# Patient Record
Sex: FEMALE | Race: WHITE | NOT HISPANIC OR LATINO | URBAN - METROPOLITAN AREA
[De-identification: names, ages, dates, MRNs, and addresses within clinical notes are randomized per-mention and may not be internally consistent; named-entity substitution may affect disease eponyms.]

---

## 2017-07-27 ENCOUNTER — OFFICE VISIT - HEALTHEAST (OUTPATIENT)
Dept: FAMILY MEDICINE | Facility: CLINIC | Age: 52
End: 2017-07-27

## 2017-07-27 DIAGNOSIS — Z00.00 HEALTH CARE MAINTENANCE: ICD-10-CM

## 2017-07-27 LAB
CHOLEST SERPL-MCNC: 256 MG/DL
FASTING STATUS PATIENT QL REPORTED: YES
HDLC SERPL-MCNC: 72 MG/DL
LDLC SERPL CALC-MCNC: 172 MG/DL
TRIGL SERPL-MCNC: 60 MG/DL

## 2017-07-27 ASSESSMENT — MIFFLIN-ST. JEOR: SCORE: 1319

## 2017-08-01 ENCOUNTER — AMBULATORY - HEALTHEAST (OUTPATIENT)
Dept: FAMILY MEDICINE | Facility: CLINIC | Age: 52
End: 2017-08-01

## 2017-08-01 DIAGNOSIS — D64.9 ANEMIA: ICD-10-CM

## 2017-08-01 DIAGNOSIS — E78.5 HYPERLIPIDEMIA: ICD-10-CM

## 2017-08-01 DIAGNOSIS — E55.9 VITAMIN D DEFICIENCY: ICD-10-CM

## 2017-08-01 LAB
HPV INTERPRETATION - HISTORICAL: NORMAL
HPV INTERPRETER - HISTORICAL: NORMAL

## 2017-08-07 LAB
BKR LAB AP ABNORMAL BLEEDING: NO
BKR LAB AP BIRTH CONTROL/HORMONES: ABNORMAL
BKR LAB AP CERVICAL APPEARANCE: NORMAL
BKR LAB AP GYN ADEQUACY: ABNORMAL
BKR LAB AP GYN INTERPRETATION: ABNORMAL
BKR LAB AP HPV REFLEX: ABNORMAL
BKR LAB AP LMP: ABNORMAL
BKR LAB AP PATIENT STATUS: ABNORMAL
BKR LAB AP PREVIOUS ABNORMAL: ABNORMAL
BKR LAB AP PREVIOUS NORMAL: 2011
HIGH RISK?: NO
PATH REPORT.COMMENTS IMP SPEC: ABNORMAL
RESULT FLAG (HE HISTORICAL CONVERSION): ABNORMAL

## 2017-08-25 ENCOUNTER — HOSPITAL ENCOUNTER (OUTPATIENT)
Dept: MAMMOGRAPHY | Facility: CLINIC | Age: 52
Discharge: HOME | End: 2017-08-25

## 2017-08-25 DIAGNOSIS — Z00.00 HEALTH CARE MAINTENANCE: ICD-10-CM

## 2019-07-23 ENCOUNTER — OFFICE VISIT - HEALTHEAST (OUTPATIENT)
Dept: FAMILY MEDICINE | Facility: CLINIC | Age: 54
End: 2019-07-23

## 2019-07-23 ENCOUNTER — RECORDS - HEALTHEAST (OUTPATIENT)
Dept: GENERAL RADIOLOGY | Facility: CLINIC | Age: 54
End: 2019-07-23

## 2019-07-23 DIAGNOSIS — Z12.31 VISIT FOR SCREENING MAMMOGRAM: ICD-10-CM

## 2019-07-23 DIAGNOSIS — N95.1 PERIMENOPAUSAL SYMPTOMS: ICD-10-CM

## 2019-07-23 DIAGNOSIS — Z00.00 ROUTINE HEALTH MAINTENANCE: ICD-10-CM

## 2019-07-23 DIAGNOSIS — M25.551 HIP PAIN, RIGHT: ICD-10-CM

## 2019-07-23 DIAGNOSIS — Z13.220 SCREENING FOR LIPID DISORDERS: ICD-10-CM

## 2019-07-23 DIAGNOSIS — Z13.1 SCREENING FOR DIABETES MELLITUS: ICD-10-CM

## 2019-07-23 DIAGNOSIS — M25.551 PAIN IN RIGHT HIP: ICD-10-CM

## 2019-07-23 DIAGNOSIS — D50.9 IRON DEFICIENCY ANEMIA, UNSPECIFIED IRON DEFICIENCY ANEMIA TYPE: ICD-10-CM

## 2019-07-23 DIAGNOSIS — Z12.4 SCREENING FOR MALIGNANT NEOPLASM OF CERVIX: ICD-10-CM

## 2019-07-23 DIAGNOSIS — Z11.4 ENCOUNTER FOR SCREENING FOR HIV: ICD-10-CM

## 2019-07-23 DIAGNOSIS — Z13.29 SCREENING FOR THYROID DISORDER: ICD-10-CM

## 2019-07-23 DIAGNOSIS — E55.9 VITAMIN D DEFICIENCY: ICD-10-CM

## 2019-07-23 DIAGNOSIS — R79.89 ELEVATED TSH: ICD-10-CM

## 2019-07-23 DIAGNOSIS — Z12.11 SCREEN FOR COLON CANCER: ICD-10-CM

## 2019-07-23 DIAGNOSIS — Z11.59 NEED FOR HEPATITIS C SCREENING TEST: ICD-10-CM

## 2019-07-23 LAB
ALBUMIN SERPL-MCNC: 4.1 G/DL (ref 3.5–5)
ALP SERPL-CCNC: 67 U/L (ref 45–120)
ALT SERPL W P-5'-P-CCNC: 30 U/L (ref 0–45)
ANION GAP SERPL CALCULATED.3IONS-SCNC: 6 MMOL/L (ref 5–18)
AST SERPL W P-5'-P-CCNC: 29 U/L (ref 0–40)
BILIRUB SERPL-MCNC: 0.4 MG/DL (ref 0–1)
BUN SERPL-MCNC: 10 MG/DL (ref 8–22)
CALCIUM SERPL-MCNC: 10.2 MG/DL (ref 8.5–10.5)
CHLORIDE BLD-SCNC: 105 MMOL/L (ref 98–107)
CHOLEST SERPL-MCNC: 249 MG/DL
CO2 SERPL-SCNC: 28 MMOL/L (ref 22–31)
CREAT SERPL-MCNC: 0.8 MG/DL (ref 0.6–1.1)
ERYTHROCYTE [DISTWIDTH] IN BLOOD BY AUTOMATED COUNT: 13.2 % (ref 11–14.5)
FASTING STATUS PATIENT QL REPORTED: YES
GFR SERPL CREATININE-BSD FRML MDRD: >60 ML/MIN/1.73M2
GLUCOSE BLD-MCNC: 86 MG/DL (ref 70–125)
HCT VFR BLD AUTO: 38.7 % (ref 35–47)
HDLC SERPL-MCNC: 75 MG/DL
HGB BLD-MCNC: 12.8 G/DL (ref 12–16)
HIV 1+2 AB+HIV1 P24 AG SERPL QL IA: NEGATIVE
LDLC SERPL CALC-MCNC: 160 MG/DL
MCH RBC QN AUTO: 27.4 PG (ref 27–34)
MCHC RBC AUTO-ENTMCNC: 33.1 G/DL (ref 32–36)
MCV RBC AUTO: 83 FL (ref 80–100)
PLATELET # BLD AUTO: 188 THOU/UL (ref 140–440)
PMV BLD AUTO: 7.6 FL (ref 7–10)
POTASSIUM BLD-SCNC: 4 MMOL/L (ref 3.5–5)
PROT SERPL-MCNC: 7.1 G/DL (ref 6–8)
RBC # BLD AUTO: 4.68 MILL/UL (ref 3.8–5.4)
SODIUM SERPL-SCNC: 139 MMOL/L (ref 136–145)
T4 FREE SERPL-MCNC: 0.7 NG/DL (ref 0.7–1.8)
TRIGL SERPL-MCNC: 70 MG/DL
TSH SERPL DL<=0.005 MIU/L-ACNC: 12.47 UIU/ML (ref 0.3–5)
WBC: 4.1 THOU/UL (ref 4–11)

## 2019-07-23 ASSESSMENT — MIFFLIN-ST. JEOR: SCORE: 1307.1

## 2019-07-24 LAB
25(OH)D3 SERPL-MCNC: 21 NG/ML (ref 30–80)
HCV AB SERPL QL IA: NEGATIVE
HPV SOURCE: NORMAL
HUMAN PAPILLOMA VIRUS 16 DNA: NEGATIVE
HUMAN PAPILLOMA VIRUS 18 DNA: NEGATIVE
HUMAN PAPILLOMA VIRUS FINAL DIAGNOSIS: NORMAL
HUMAN PAPILLOMA VIRUS OTHER HR: NEGATIVE
SPECIMEN DESCRIPTION: NORMAL

## 2019-07-25 ENCOUNTER — HOSPITAL ENCOUNTER (OUTPATIENT)
Dept: MAMMOGRAPHY | Facility: CLINIC | Age: 54
Discharge: HOME | End: 2019-07-25

## 2019-07-25 ENCOUNTER — COMMUNICATION - HEALTHEAST (OUTPATIENT)
Dept: FAMILY MEDICINE | Facility: CLINIC | Age: 54
End: 2019-07-25

## 2019-07-25 DIAGNOSIS — Z12.31 VISIT FOR SCREENING MAMMOGRAM: ICD-10-CM

## 2019-07-25 DIAGNOSIS — E06.3 AUTOIMMUNE HYPOTHYROIDISM: ICD-10-CM

## 2019-07-25 LAB — THYROID PEROXIDASE ANTIBODIES - HISTORICAL: >2000 IU/ML (ref 0–5.6)

## 2019-07-26 ENCOUNTER — HOSPITAL ENCOUNTER (OUTPATIENT)
Dept: ULTRASOUND IMAGING | Facility: CLINIC | Age: 54
Discharge: HOME | End: 2019-07-26

## 2019-07-26 DIAGNOSIS — E06.3 AUTOIMMUNE HYPOTHYROIDISM: ICD-10-CM

## 2019-07-30 LAB
BKR LAB AP ABNORMAL BLEEDING: NORMAL
BKR LAB AP BIRTH CONTROL/HORMONES: NORMAL
BKR LAB AP CERVICAL APPEARANCE: NORMAL
BKR LAB AP GYN ADEQUACY: NORMAL
BKR LAB AP GYN INTERPRETATION: NORMAL
BKR LAB AP HPV REFLEX: NORMAL
BKR LAB AP LMP: NORMAL
BKR LAB AP PATIENT STATUS: NORMAL
BKR LAB AP PREVIOUS ABNORMAL: NORMAL
BKR LAB AP PREVIOUS NORMAL: NORMAL
HIGH RISK?: NO
PATH REPORT.COMMENTS IMP SPEC: NORMAL
RESULT FLAG (HE HISTORICAL CONVERSION): NORMAL

## 2019-08-12 ENCOUNTER — COMMUNICATION - HEALTHEAST (OUTPATIENT)
Dept: FAMILY MEDICINE | Facility: CLINIC | Age: 54
End: 2019-08-12

## 2019-09-06 ENCOUNTER — OFFICE VISIT - HEALTHEAST (OUTPATIENT)
Dept: FAMILY MEDICINE | Facility: CLINIC | Age: 54
End: 2019-09-06

## 2019-09-06 DIAGNOSIS — E06.3 AUTOIMMUNE HYPOTHYROIDISM: ICD-10-CM

## 2019-09-06 DIAGNOSIS — Z12.11 SCREEN FOR COLON CANCER: ICD-10-CM

## 2019-09-07 LAB
T4 FREE SERPL-MCNC: 1.4 NG/DL (ref 0.7–1.8)
TSH SERPL DL<=0.005 MIU/L-ACNC: 0.18 UIU/ML (ref 0.3–5)

## 2019-09-09 ENCOUNTER — COMMUNICATION - HEALTHEAST (OUTPATIENT)
Dept: FAMILY MEDICINE | Facility: CLINIC | Age: 54
End: 2019-09-09

## 2019-09-09 ENCOUNTER — AMBULATORY - HEALTHEAST (OUTPATIENT)
Dept: FAMILY MEDICINE | Facility: CLINIC | Age: 54
End: 2019-09-09

## 2019-09-09 DIAGNOSIS — E06.3 AUTOIMMUNE HYPOTHYROIDISM: ICD-10-CM

## 2019-12-11 ENCOUNTER — COMMUNICATION - HEALTHEAST (OUTPATIENT)
Dept: FAMILY MEDICINE | Facility: CLINIC | Age: 54
End: 2019-12-11

## 2019-12-11 DIAGNOSIS — E06.3 AUTOIMMUNE HYPOTHYROIDISM: ICD-10-CM

## 2020-01-20 ENCOUNTER — AMBULATORY - HEALTHEAST (OUTPATIENT)
Dept: LAB | Facility: CLINIC | Age: 55
End: 2020-01-20

## 2020-01-20 DIAGNOSIS — E06.3 AUTOIMMUNE HYPOTHYROIDISM: ICD-10-CM

## 2020-01-20 LAB
T4 FREE SERPL-MCNC: 0.8 NG/DL (ref 0.7–1.8)
TSH SERPL DL<=0.005 MIU/L-ACNC: 8.61 UIU/ML (ref 0.3–5)

## 2020-01-22 ENCOUNTER — COMMUNICATION - HEALTHEAST (OUTPATIENT)
Dept: FAMILY MEDICINE | Facility: CLINIC | Age: 55
End: 2020-01-22

## 2020-01-22 DIAGNOSIS — E06.3 AUTOIMMUNE HYPOTHYROIDISM: ICD-10-CM

## 2020-06-09 ENCOUNTER — AMBULATORY - HEALTHEAST (OUTPATIENT)
Dept: FAMILY MEDICINE | Facility: CLINIC | Age: 55
End: 2020-06-09

## 2020-06-09 ENCOUNTER — VIRTUAL VISIT (OUTPATIENT)
Dept: FAMILY MEDICINE | Facility: OTHER | Age: 55
End: 2020-06-09

## 2020-06-09 DIAGNOSIS — Z20.822 SUSPECTED COVID-19 VIRUS INFECTION: ICD-10-CM

## 2020-06-10 NOTE — PROGRESS NOTES
"Date: 2020 10:52:38  Clinician: Eduarda Mir  Clinician NPI: [id number]  Patient: Myah Soares  Patient : 1965  Patient Address: [address] [place] [address] [place] [address]  Patient Phone: [phone]  Visit Protocol: URI  Patient Summary:  Myah is a 54 year old ( : 1965 ) female who initiated a Visit for COVID-19 (Coronavirus) evaluation and screening. When asked the question \"Please sign me up to receive news, health information and promotions. \", Myah responded \"No\".    Myah states her symptoms started 1-2 days ago.   Her symptoms consist of a sore throat, myalgia, and a headache.   Symptom details     Sore throat: Myah reports having mild throat pain (1-3 on a 10 point pain scale), does not have exudate on her tonsils, and can swallow liquids. The lymph nodes in her neck are not enlarged. A rash has not appeared on the skin since the sore throat started.     Headache: She states the headache is mild (1-3 on a 10 point pain scale).      Myah denies having wheezing, nausea, teeth pain, ageusia, diarrhea, enlarged lymph nodes, malaise, anosmia, facial pain or pressure, fever, cough, nasal congestion, vomiting, rhinitis, ear pain, and chills. She also denies having recent facial or sinus surgery in the past 60 days and taking antibiotic medication for the symptoms. She is not experiencing dyspnea.   Precipitating events  Within the past week, Myah has not been exposed to someone with strep throat. She has not recently been exposed to someone with influenza. Myah has been in close contact with the following high risk individuals: adults 65 or older and people with asthma, heart disease or diabetes.   Pertinent COVID-19 (Coronavirus) information  In the past 14 days, Myah has not worked in a congregate living setting.   She does not work or volunteer as healthcare worker or a  and does not work or volunteer in a healthcare facility.   Myah also has not lived in a " congregate living setting in the past 14 days. She does not live with a healthcare worker.   Myah has not had a close contact with a laboratory-confirmed COVID-19 patient within 14 days of symptom onset.   Pertinent medical history  Myah does not get yeast infections when she takes antibiotics.   Myah does not need a return to work/school note.   Weight: 155 lbs   Myah does not smoke or use smokeless tobacco.   Weight: 155 lbs    MEDICATIONS: levothyroxine oral, ALLERGIES: NKDA  Clinician Response:  Dear Myah,   Your symptoms show that you may have coronavirus (COVID-19). This illness can cause fever, cough and trouble breathing. Many people get a mild case and get better on their own. Some people can get very sick.  What should I do?  We would like to test you for this virus. This will be a curbside test done outside the clinic.   1. Please call [phone] to schedule your visit. Explain that you were referred by [facility] to have a COVID-19 test. Be ready to share your [facility] visit ID number.  The following will serve as your written order for this COVID Test, ordered by me, for the indication of suspected COVID [Z20.828]: The test will be ordered in [employer], our electronic health record, after you are scheduled. It will show as ordered and authorized by Lio Jensen MD.  Order: COVID-19 (Coronavirus) PCR for SYMPTOMATIC testing from [facility].      2. When it's time for your COVID test:  Stay at least 6 feet away from others. (If someone will drive you to your test, stay in the backseat, as far away from the  as you can.)   Cover your mouth and nose with a mask, tissue or washcloth.  Go straight to the testing site. Don't make any stops on the way there or back.      3.Starting now: Stay home and away from others (self-isolate) until:   You've had no fever---and no medicine that reduces fever---for 3 full days (72 hours). And...   Your other symptoms have gotten better. For example, your cough or breathing has  "improved. And...   At least 10 days have passed since your symptoms started.       During this time, don't leave the house except for testing or medical care.   Stay in your own room, even for meals. Use your own bathroom if you can.   Stay away from others in your home. No hugging, kissing or shaking hands. No visitors.  Don't go to work, school or anywhere else.    Clean \"high touch\" surfaces often (doorknobs, counters, handles, etc.). Use a household cleaning spray or wipes. You'll find a full list of  on the EPA website: www.epa.gov/pesticide-registration/list-n-disinfectants-use-against-sars-cov-2.   Cover your mouth and nose with a mask, tissue or washcloth to avoid spreading germs.  Wash your hands and face often. Use soap and water.  Caregivers in these groups are at risk for severe illness due to COVID-19:  o People 65 years and older  o People who live in a nursing home or long-term care facility  o People with chronic disease (lung, heart, cancer, diabetes, kidney, liver, immunologic)  o People who have a weakened immune system, including those who:   Are in cancer treatment  Take medicine that weakens the immune system, such as corticosteroids  Had a bone marrow or organ transplant  Have an immune deficiency  Have poorly controlled HIV or AIDS  Are obese (body mass index of 40 or higher)  Smoke regularly   o Caregivers should wear gloves while washing dishes, handling laundry and cleaning bedrooms and bathrooms.  o Use caution when washing and drying laundry: Don't shake dirty laundry, and use the warmest water setting that you can.  o For more tips, go to www.cdc.gov/coronavirus/2019-ncov/downloads/10Things.pdf.      How can I take care of myself?   Get lots of rest. Drink extra fluids (unless a doctor has told you not to).   Take Tylenol (acetaminophen) for fever or pain. If you have liver or kidney problems, ask your family doctor if it's okay to take Tylenol.   Adults can take either:    650 " mg (two 325 mg pills) every 4 to 6 hours, or...   1,000 mg (two 500 mg pills) every 8 hours as needed.    Note: Don't take more than 3,000 mg in one day. Acetaminophen is found in many medicines (both prescribed and over-the-counter medicines). Read all labels to be sure you don't take too much.   For children, check the Tylenol bottle for the right dose. The dose is based on the child's age or weight.    If you have other health problems (like cancer, heart failure, an organ transplant or severe kidney disease): Call your specialty clinic if you don't feel better in the next 2 days.       Know when to call 911. Emergency warning signs include:    Trouble breathing or shortness of breath Pain or pressure in the chest that doesn't go away Feeling confused like you haven't felt before, or not being able to wake up Bluish-colored lips or face  5.Sign up for [employer]. We know it's scary to hear that you might have COVID-19. We want to track your symptoms to make sure you're okay over the next 2 weeks. Please look for an email from [employer]---this is a free, online program that we'll use to keep in touch. To sign up, follow the link in the email. Learn more at [email or web address].      Where can I get more information?   [facility] -- About COVID-19: [email or web address]   CDC -- What to Do If You're Sick: www.cdc.gov/coronavirus/2019-ncov/about/steps-when-sick.html   CDC -- Ending Home Isolation: www.cdc.gov/coronavirus/2019-ncov/hcp/disposition-in-home-patients.html   CDC -- Caring for Someone: www.cdc.gov/coronavirus/2019-ncov/if-you-are-sick/care-for-someone.html   [facility] -- Interim Guidance for Hospital Discharge to Home: www.health.[facility].mn.us/diseases/coronavirus/hcp/hospdischarge.pdf   [facility] clinical trials (COVID-19 research studies): clinicalaffairs.[facility]/umn-clinical-trials    Below are the COVID-19 hotlines at the [place] Department of Health ([facility]).  Interpreters are available.    For health questions: Call 929-853-1000 or 1-703.289.6068 (7 a.m. to 7 p.m.) For questions about schools and childcare: Call 876-929-7745 or 1-279.952.4204 (7 a.m. to 7 p.m.)    Diagnosis: Myalgia  Diagnosis ICD: M79.1

## 2020-06-12 ENCOUNTER — COMMUNICATION - HEALTHEAST (OUTPATIENT)
Dept: FAMILY MEDICINE | Facility: CLINIC | Age: 55
End: 2020-06-12

## 2020-06-19 ENCOUNTER — AMBULATORY - HEALTHEAST (OUTPATIENT)
Dept: FAMILY MEDICINE | Facility: CLINIC | Age: 55
End: 2020-06-19

## 2020-06-19 ENCOUNTER — VIRTUAL VISIT (OUTPATIENT)
Dept: FAMILY MEDICINE | Facility: OTHER | Age: 55
End: 2020-06-19

## 2020-06-19 DIAGNOSIS — Z20.822 SUSPECTED COVID-19 VIRUS INFECTION: ICD-10-CM

## 2020-06-19 NOTE — PROGRESS NOTES
"Date: 2020 01:43:49  Clinician: Yasemin Macias  Clinician NPI: [id number]  Patient: Myah Soares  Patient : 1965  Patient Address: [address] [place] [address] [address] [place] [id number]  Patient Phone: [phone]  Visit Protocol: URI  Patient Summary:  Myah is a 54 year old ( : 1965 ) female who initiated a Visit for COVID-19 (Coronavirus) evaluation and screening. When asked the question \"Please sign me up to receive news, health information and promotions. \", Myah responded \"No\".    Myah states her symptoms started 1-2 days ago.   Her symptoms consist of myalgia and a cough. She is experiencing mild difficulty breathing with activities but can speak normally in full sentences.   Symptom details   Cough: Myah coughs every 5-10 minutes and her cough is not more bothersome at night. Phlegm does not come into her throat when she coughs. She does not believe her cough is caused by post-nasal drip.    Myah denies having wheezing, nausea, teeth pain, ageusia, diarrhea, sore throat, enlarged lymph nodes, malaise, anosmia, facial pain or pressure, fever, nasal congestion, vomiting, rhinitis, ear pain, headache, and chills. She also denies having recent facial or sinus surgery in the past 60 days and taking antibiotic medication for the symptoms.   Precipitating events  She has not recently been exposed to someone with influenza. Myah has been in close contact with the following high risk individuals: people with asthma, heart disease or diabetes.   Pertinent COVID-19 (Coronavirus) information  In the past 14 days, Myah has not worked in a congregate living setting.   She does not work or volunteer as healthcare worker or a  and does not work or volunteer in a healthcare facility.   Myah also has not lived in a congregate living setting in the past 14 days. She does not live with a healthcare worker.   Myah has not had a close contact with a laboratory-confirmed COVID-19 patient within " 14 days of symptom onset.   Pertinent medical history  Myah does not get yeast infections when she takes antibiotics.   Myah does not need a return to work/school note.   Weight: 157 lbs   Myah does not smoke or use smokeless tobacco.   Weight: 157 lbs    MEDICATIONS: levothyroxine oral, ALLERGIES: NKDA  Clinician Response:  Dear Myah,      Your symptoms show that you may have coronavirus (COVID-19). This illness can cause fever, cough and trouble breathing. Many people get a mild case and get better on their own. Some people can get very sick.  What should I do?  We would like to test you for this virus.   1. Please call [phone] to schedule your visit. Explain that you were referred by [facility] to have a COVID-19 test. Be ready to [facility].  The following will serve as your written order for this COVID Test, ordered by me, for the indication of suspected COVID [Z20.828]: The test will be ordered in [employer], our electronic health record, after you are scheduled. It will show as ordered and authorized by Lio Jensen MD.  Order: COVID-19 (Coronavirus) PCR for SYMPTOMATIC testing from [facility].      2. When it's time for your COVID test:  Stay at least 6 feet away from others. (If someone will drive you to your test, stay in the backseat, as far away from the  as you can.)   Cover your mouth and nose with a mask, tissue or washcloth.  Go straight to the testing site. Don't make any stops on the way there or back.      3.Starting now: Stay home and away from others (self-isolate) until:   You've had no fever---and no medicine that reduces fever---for 3 full days (72 hours). And...   Your other symptoms have gotten better. For example, your cough or breathing has improved. And...   At least 10 days have passed since your symptoms started.       During this time, don't leave the house except for testing or medical care.   Stay in your own room, even for meals. Use your own bathroom if you  "can.   Stay away from others in your home. No hugging, kissing or shaking hands. No visitors.  Don't go to work, school or anywhere else.    Clean \"high touch\" surfaces often (doorknobs, counters, handles, etc.). Use a household cleaning spray or wipes. You'll find a full list of  on the EPA website: www.epa.gov/pesticide-registration/list-n-disinfectants-use-against-sars-cov-2.   Cover your mouth and nose with a mask, tissue or washcloth to avoid spreading germs.  Wash your hands and face often. Use soap and water.  Caregivers in these groups are at risk for severe illness due to COVID-19:  o People 65 years and older  o People who live in a nursing home or long-term care facility  o People with chronic disease (lung, heart, cancer, diabetes, kidney, liver, immunologic)  o People who have a weakened immune system, including those who:   Are in cancer treatment  Take medicine that weakens the immune system, such as corticosteroids  Had a bone marrow or organ transplant  Have an immune deficiency  Have poorly controlled HIV or AIDS  Are obese (body mass index of 40 or higher)  Smoke regularly   o Caregivers should wear gloves while washing dishes, handling laundry and cleaning bedrooms and bathrooms.  o Use caution when washing and drying laundry: Don't shake dirty laundry, and use the warmest water setting that you can.  o For more tips, go to www.cdc.gov/coronavirus/2019-ncov/downloads/10Things.pdf.      How can I take care of myself?   Get lots of rest. Drink extra fluids (unless a doctor has told you not to).   Take Tylenol (acetaminophen) for fever or pain. If you have liver or kidney problems, ask your family doctor if it's okay to take Tylenol.   Adults can take either:    650 mg (two 325 mg pills) every 4 to 6 hours, or...   1,000 mg (two 500 mg pills) every 8 hours as needed.    Note: Don't take more than 3,000 mg in one day. Acetaminophen is found in many medicines (both prescribed and " over-the-counter medicines). Read all labels to be sure you don't take too much.   For children, check the Tylenol bottle for the right dose. The dose is based on the child's age or weight.    If you have other health problems (like cancer, heart failure, an organ transplant or severe kidney disease): Call your specialty clinic if you don't feel better in the next 2 days.       Know when to call 911. Emergency warning signs include:    Trouble breathing or shortness of breath Pain or pressure in the chest that doesn't go away Feeling confused like you haven't felt before, or not being able to wake up Bluish-colored lips or face.  Where can I get more information?    [employer] [facility] -- About COVID-19: [email or web address]   CDC -- What to Do If You're Sick: www.cdc.gov/coronavirus/2019-ncov/about/steps-when-sick.html   CDC -- Ending Home Isolation: www.cdc.gov/coronavirus/2019-ncov/hcp/disposition-in-home-patients.html   CDC -- Caring for Someone: www.cdc.gov/coronavirus/2019-ncov/if-you-are-sick/care-for-someone.html   [facility] -- Interim Guidance for Hospital Discharge to Home: www.health.[facility].mn.us/diseases/coronavirus/hcp/hospdischarge.pdf   [facility] clinical trials (COVID-19 research studies): clinicalaffairs.[facility]-clinical-trials    Below are the COVID-19 hotlines at the [facility] of Health ([facility]). Interpreters are available.    For health questions: Call 722-219-7872 or 1-989.675.8350 (7 a.m. to 7 p.m.) For questions about schools and childcare: Call 466-811-9607 or 1-102.115.5133 (7 a.m. to 7 p.m.)    Diagnosis: Cough  Diagnosis ICD: R05

## 2020-06-22 ENCOUNTER — COMMUNICATION - HEALTHEAST (OUTPATIENT)
Dept: FAMILY MEDICINE | Facility: CLINIC | Age: 55
End: 2020-06-22

## 2020-08-14 ENCOUNTER — VIRTUAL VISIT (OUTPATIENT)
Dept: FAMILY MEDICINE | Facility: OTHER | Age: 55
End: 2020-08-14

## 2020-08-14 ENCOUNTER — AMBULATORY - HEALTHEAST (OUTPATIENT)
Dept: FAMILY MEDICINE | Facility: CLINIC | Age: 55
End: 2020-08-14

## 2020-08-14 DIAGNOSIS — Z20.822 SUSPECTED COVID-19 VIRUS INFECTION: ICD-10-CM

## 2020-08-14 NOTE — PROGRESS NOTES
"Date: 2020 14:23:50  Clinician: Jihan Wagner  Clinician NPI: [id number]  Patient: Myah Soares  Patient : 1965  Patient Address: [address] [place] [address] [address] [place] [id number]  Patient Phone: [phone]  Visit Protocol: URI  Patient Summary:  Myah is a 54 year old ( : 1965 ) female who initiated a Visit for COVID-19 (Coronavirus) evaluation and screening. When asked the question \"Please sign me up to receive news, health information and promotions. \", Myah responded \"No\".    Myah states her symptoms started 1-2 days ago.   Her symptoms consist of a sore throat and a cough.   Symptom details     Cough: Myah coughs a few times an hour and her cough is not more bothersome at night. Phlegm does not come into her throat when she coughs. She does not believe her cough is caused by post-nasal drip.     Sore throat: Myah reports having mild throat pain (1-3 on a 10 point pain scale), does not have exudate on her tonsils, and can swallow liquids. The lymph nodes in her neck are not enlarged. A rash has not appeared on the skin since the sore throat started.      Myah denies having ear pain, headache, chills, enlarged lymph nodes, nausea, teeth pain, ageusia, diarrhea, nasal congestion, vomiting, rhinitis, myalgias, anosmia, facial pain or pressure, fever, wheezing, and malaise. She also denies taking antibiotic medication in the past month and having recent facial or sinus surgery in the past 60 days. She is not experiencing dyspnea.   Precipitating events  Within the past week, Myah has not been exposed to someone with strep throat. She has not recently been exposed to someone with influenza. Myah has been in close contact with the following high risk individuals: people with asthma, heart disease or diabetes and adults 65 or older.   Pertinent COVID-19 (Coronavirus) information  In the past 14 days, Myah has not worked in a congregate living setting.   She does not work or volunteer as " healthcare worker or a  and does not work or volunteer in a healthcare facility.   Myah also has not lived in a congregate living setting in the past 14 days. She does not live with a healthcare worker.   Myah has not had a close contact with a laboratory-confirmed COVID-19 patient within 14 days of symptom onset.   Since December 2019, Myah and has not had upper respiratory infection or influenza-like illness. Has not been diagnosed with lab-confirmed COVID-19 test   Pertinent medical history  Myah does not get yeast infections when she takes antibiotics.   Myah does not need a return to work/school note.   Weight: 158 lbs   Myah does not smoke or use smokeless tobacco.   Weight: 158 lbs    MEDICATIONS: levothyroxine oral, ALLERGIES: NKDA  Clinician Response:  Dear Myah,   Your symptoms show that you may have coronavirus (COVID-19). This illness can cause fever, cough and trouble breathing. Many people get a mild case and get better on their own. Some people can get very sick.  What should I do?  We would like to test you for this virus.   1. Please call [phone] to schedule your visit. Explain that you were referred by [facility] to have a COVID-19 test. Be ready to [facility].  The following will serve as your written order for this COVID Test, ordered by me, for the indication of suspected COVID [Z20.828]: The test will be ordered in [employer], our electronic health record, after you are scheduled. It will show as ordered and authorized by Lio Jensen MD.  Order: COVID-19 (Coronavirus) PCR for SYMPTOMATIC testing [facility].      2. When it's time for your COVID test:  Stay at least 6 feet away from others. (If someone will drive you to your test, stay in the backseat, as far away from the  as you can.)   Cover your mouth and nose with a mask, tissue or washcloth.  Go straight to the testing site. Don't make any stops on the way there or back.      3.Starting now:  "Stay home and away from others (self-isolate) until:   You've had no fever---and no medicine that reduces fever---for one full day (24 hours). And...   Your other symptoms have gotten better. For example, your cough or breathing has improved. And...   At least 10 days have passed since your symptoms started.       During this time, don't leave the house except for testing or medical care.   Stay in your own room, even for meals. Use your own bathroom if you can.   Stay away from others in your home. No hugging, kissing or shaking hands. No visitors.  Don't go to work, school or anywhere else.    Clean \"high touch\" surfaces often (doorknobs, counters, handles, etc.). Use a household cleaning spray or wipes. You'll find a full list of  on the EPA website: www.epa.gov/pesticide-registration/list-n-disinfectants-use-against-sars-cov-2.   Cover your mouth and nose with a mask, tissue or washcloth to avoid spreading germs.  Wash your hands and face often. Use soap and water.  Caregivers in these groups are at risk for severe illness due to COVID-19:  o People 65 years and older  o People who live in a nursing home or long-term care facility  o People with chronic disease (lung, heart, cancer, diabetes, kidney, liver, immunologic)  o People who have a weakened immune system, including those who:   Are in cancer treatment  Take medicine that weakens the immune system, such as corticosteroids  Had a bone marrow or organ transplant  Have an immune deficiency  Have poorly controlled HIV or AIDS  Are obese (body mass index of 40 or higher)  Smoke regularly   o Caregivers should wear gloves while washing dishes, handling laundry and cleaning bedrooms and bathrooms.  o Use caution when washing and drying laundry: Don't shake dirty laundry, and use the warmest water setting that you can.  o For more tips, go to www.cdc.gov/coronavirus/2019-ncov/downloads/10Things.pdf.    4.Sign up for Shyann Cintron. We know it's scary to " hear that you might have COVID-19. We want to track your symptoms to make sure you're okay over the next 2 weeks. Please look for an email from [employer]---this is a free, online program that we'll use to keep in touch. To sign up, follow the link in the email. Learn more at [email or web address]  How can I take care of myself?   Get lots of rest. Drink extra fluids (unless a doctor has told you not to).   Take Tylenol (acetaminophen) for fever or pain. If you have liver or kidney problems, ask your family doctor if it's okay to take Tylenol.   Adults can take either:    650 mg (two 325 mg pills) every 4 to 6 hours, or...   1,000 mg (two 500 mg pills) every 8 hours as needed.    Note: Don't take more than 3,000 mg in one day. Acetaminophen is found in many medicines (both prescribed and over-the-counter medicines). Read all labels to be sure you don't take too much.   For children, check the Tylenol bottle for the right dose. The dose is based on the child's age or weight.    If you have other health problems (like cancer, heart failure, an organ transplant or severe kidney disease): Call your specialty clinic if you don't feel better in the next 2 days.       Know when to call 911. Emergency warning signs include:    Trouble breathing or shortness of breath Pain or pressure in the chest that doesn't go away Feeling confused like you haven't felt before, or not being able to wake up Bluish-colored lips or face.  Where can I get more information?    [employer] -- About COVID-19: [email or web address]   CDC -- What to Do If You're Sick: www.cdc.gov/coronavirus/2019-ncov/about/steps-when-sick.html   CDC -- Ending Home Isolation: www.cdc.gov/coronavirus/2019-ncov/hcp/disposition-in-home-patients.html   CDC -- Caring for Someone: www.cdc.gov/coronavirus/2019-ncov/if-you-are-sick/care-for-someone.html   [facility] -- Interim Guidance for Hospital Discharge to Home:  www.health.[facility].mn.us/diseases/coronavirus/hcp/hospdischarge.pdf   [facility] clinical trials (COVID-19 research studies): clinicalaffairs.[facility]/umn-clinical-trials    Below are the COVID-19 hotlines at the [facility] ([facility]). Interpreters are available.    For health questions: Call 752-172-2482 or 1-424.647.1973 (7 a.m. to 7 p.m.) For questions about schools and childcare: Call 565-886-2967 or 1-600.601.3621 (7 a.m. to 7 p.m.)    Diagnosis: Cough  Diagnosis ICD: R05

## 2020-08-15 ENCOUNTER — AMBULATORY - HEALTHEAST (OUTPATIENT)
Dept: FAMILY MEDICINE | Facility: CLINIC | Age: 55
End: 2020-08-15

## 2020-08-15 DIAGNOSIS — Z20.822 SUSPECTED COVID-19 VIRUS INFECTION: ICD-10-CM

## 2020-08-18 ENCOUNTER — COMMUNICATION - HEALTHEAST (OUTPATIENT)
Dept: SCHEDULING | Facility: CLINIC | Age: 55
End: 2020-08-18

## 2020-09-17 ENCOUNTER — COMMUNICATION - HEALTHEAST (OUTPATIENT)
Dept: FAMILY MEDICINE | Facility: CLINIC | Age: 55
End: 2020-09-17

## 2020-09-17 DIAGNOSIS — Z23 NEED FOR INFLUENZA VACCINATION: ICD-10-CM

## 2020-09-17 DIAGNOSIS — Z12.11 SCREENING FOR COLON CANCER: ICD-10-CM

## 2020-09-24 ENCOUNTER — AMBULATORY - HEALTHEAST (OUTPATIENT)
Dept: LAB | Facility: CLINIC | Age: 55
End: 2020-09-24

## 2020-09-24 ENCOUNTER — AMBULATORY - HEALTHEAST (OUTPATIENT)
Dept: NURSING | Facility: CLINIC | Age: 55
End: 2020-09-24

## 2020-09-24 DIAGNOSIS — E06.3 AUTOIMMUNE HYPOTHYROIDISM: ICD-10-CM

## 2020-09-24 DIAGNOSIS — Z23 NEED FOR INFLUENZA VACCINATION: ICD-10-CM

## 2020-09-24 LAB
T4 FREE SERPL-MCNC: 0.8 NG/DL (ref 0.7–1.8)
TSH SERPL DL<=0.005 MIU/L-ACNC: 7.01 UIU/ML (ref 0.3–5)

## 2020-09-29 ENCOUNTER — RECORDS - HEALTHEAST (OUTPATIENT)
Dept: ADMINISTRATIVE | Facility: OTHER | Age: 55
End: 2020-09-29

## 2020-09-29 LAB — COLOGUARD-ABSTRACT: NEGATIVE

## 2020-10-01 ENCOUNTER — VIRTUAL VISIT (OUTPATIENT)
Dept: FAMILY MEDICINE | Facility: OTHER | Age: 55
End: 2020-10-01

## 2020-10-01 ENCOUNTER — AMBULATORY - HEALTHEAST (OUTPATIENT)
Dept: INTERNAL MEDICINE | Facility: CLINIC | Age: 55
End: 2020-10-01

## 2020-10-01 DIAGNOSIS — Z20.822 SUSPECTED 2019 NOVEL CORONAVIRUS INFECTION: ICD-10-CM

## 2020-10-01 NOTE — PROGRESS NOTES
"Date: 10/01/2020 11:29:31  Clinician: Griffin Dominguez  Clinician NPI: [id number]  Patient: Myah Soares  Patient : 1965  Patient Address: [address] [place] [address] [place] [address]  Patient Phone: [phone]  Visit Protocol: URI  Patient Summary:  Myah is a 54 year old ( : 1965 ) female who initiated a [facility] Visit for COVID-19 (Coronavirus) evaluation and screening. When asked the question \"Please sign me up to receive news, health information and promotions. \", Myah responded \"No\".    Myah states her symptoms started 1-2 days ago.   Her symptoms consist of a cough, nasal congestion, rhinitis, and myalgia.   Symptom details     Nasal secretions: The color of her mucus is clear.    Cough: Myah coughs a few times an hour and her cough is not more bothersome at night. Phlegm does not come into her throat when she coughs. She does not believe her cough is caused by post-nasal drip.      Myah denies having ear pain, headache, wheezing, fever, enlarged lymph nodes, anosmia, vomiting, nausea, facial pain or pressure, chills, malaise, sore throat, teeth pain, ageusia, and diarrhea. She also denies taking antibiotic medication in the past month and having recent facial or sinus surgery in the past 60 days. She is not experiencing dyspnea.   Precipitating events  She has not recently been exposed to someone with influenza. Myah has been in close contact with the following high risk individuals: adults 65 or older and people with asthma, heart disease or diabetes.   Pertinent COVID-19 (Coronavirus) information  In the past 14 days, Myah has not worked in a congregate living setting.   She does not work or volunteer as healthcare worker or a  and does not work or volunteer in a healthcare facility.   Myah also has not lived in a congregate living setting in the past 14 days. She does not live with a healthcare worker.   Myah has not had a close contact with a laboratory-confirmed " COVID-19 patient within 14 days of symptom onset.   Since December 2019, Myah and has had upper respiratory infection (URI) or influenza-like illness. Has not been diagnosed with lab-confirmed COVID-19 test      Date(s) of previous URI or influenza-like illness (free-text): March 2020     Symptoms Myah experienced during previous URI or influenza-like illness as reported by the patient (free-text): Cough, runny nose, sneezing, achiness        Pertinent medical history  Myah does not get yeast infections when she takes antibiotics.   Myah does not need a return to work/school note.   Weight: 155 lbs   Myah does not smoke or use smokeless tobacco.   Weight: 155 lbs    MEDICATIONS: levothyroxine oral, ALLERGIES: NKDA  Clinician Response:  Dear Myah,   Your symptoms show that you may have coronavirus (COVID-19). This illness can cause fever, cough and trouble breathing. Many people get a mild case and get better on their own. Some people can get very sick.  What should I do?  We would like to test you for this virus.   1. Please call [phone] to schedule your visit. Explain that you were referred by [facility] to have a COVID-19 test. Be [facility].  The following will serve as your written order for this COVID Test, ordered by me, for the indication of suspected COVID [Z20.828]: The test will be ordered in [employer], our electronic health record, after you are scheduled. It will show as ordered and authorized by Lio Jensen MD.  Order: COVID-19 (Coronavirus) PCR [facility]TOMATIC [facility].      2. When it's time for your COVID test:  Stay at least 6 feet away from others. (If someone will drive you to your test, stay in the backseat, as far away from the  as you can.)   Cover your mouth and nose with a mask, tissue or washcloth.  Go straight to the testing site. Don't make any stops on the way there or back.      3.Starting now: Stay home and away from others  "(self-isolate) until:   You've had no fever---and no medicine that reduces fever---for one full day (24 hours). And...   Your other symptoms have gotten better. For example, your cough or breathing has improved. And...   At least 10 days have passed since your symptoms started.       During this time, don't leave the house except for testing or medical care.   Stay in your own room, even for meals. Use your own bathroom if you can.   Stay away from others in your home. No hugging, kissing or shaking hands. No visitors.  Don't go to work, school or anywhere else.    Clean \"high touch\" surfaces often (doorknobs, counters, handles, etc.). Use a household cleaning spray or wipes. You'll find a full list of  on the EPA website: www.epa.gov/pesticide-registration/list-n-disinfectants-use-against-sars-cov-2.   Cover your mouth and nose with a mask, tissue or washcloth to avoid spreading germs.  Wash your hands and face often. Use soap and water.  Caregivers in these groups are at risk for severe illness due to COVID-19:  o People 65 years and older  o People who live in a nursing home or long-term care facility  o People with chronic disease (lung, heart, cancer, diabetes, kidney, liver, immunologic)  o People who have a weakened immune system, including those who:   Are in cancer treatment  Take medicine that weakens the immune system, such as corticosteroids  Had a bone marrow or organ transplant  Have an immune deficiency  Have poorly controlled HIV or AIDS  Are obese (body mass index of 40 or higher)  Smoke regularly   o Caregivers should wear gloves while washing dishes, handling laundry and cleaning bedrooms and bathrooms.  o Use caution when washing and drying laundry: Don't shake dirty laundry, and use the warmest water setting that you can.  o For more tips, go to www.cdc.gov/coronavirus/2019-ncov/downloads/10Things.pdf.    How can I take care of myself?    Get lots of rest. Drink extra fluids (unless a " doctor has told you not to).   Take Tylenol (acetaminophen) for fever or pain. If you have liver or kidney problems, ask your family doctor if it's okay to take Tylenol.   Adults can take either:    650 mg (two 325 mg pills) every 4 to 6 hours, or...   1,000 mg (two 500 mg pills) every 8 hours as needed.    Note: Don't take more than 3,000 mg in one day. Acetaminophen is found in many medicines (both prescribed and over-the-counter medicines). Read all labels to be sure you don't take too much.   For children, check the Tylenol bottle for the right dose. The dose is based on the child's age or weight.    If you have other health problems (like cancer, heart failure, an organ transplant or severe kidney disease): Call your specialty clinic if you don't feel better in the next 2 days.       Know when to call 911. Emergency warning signs include:    Trouble breathing or shortness of breath Pain or pressure in the chest that doesn't go away Feeling confused like you haven't felt before, or not being able to wake up Bluish-colored lips or face.  Where can I get more information?   [facility] -- About COVID-19: [email or web address]   CDC -- What to Do If You're Sick: www.cdc.gov/coronavirus/2019-ncov/about/steps-when-sick.html   CDC -- Ending Home Isolation: www.cdc.gov/coronavirus/2019-ncov/hcp/disposition-in-home-patients.html   CDC -- Caring for Someone: www.cdc.gov/coronavirus/2019-ncov/if-you-are-sick/care-for-someone.html   [facility] -- Interim Guidance for Hospital Discharge to Home: www.health.[facility].mn.us/diseases/coronavirus/hcp/hospdischarge.pdf   [facility] clinical trials (COVID-19 research studies): clinicalaffairs.[facility]/umn-clinical-trials    Below are the COVID-19 hotlines at the [place] [facility] ([facility]). Interpreters are available.    For health questions: Call 129-238-7012 or 6-603-093-1897 (7 a.m. to 7 p.m.) For questions about schools and childcare: Call  479-833-2653 or 1-157.946.1725 (7 a.m. to 7 p.m.)    Diagnosis: Nasal congestion  Diagnosis ICD: R09.81

## 2020-10-03 ENCOUNTER — AMBULATORY - HEALTHEAST (OUTPATIENT)
Dept: FAMILY MEDICINE | Facility: CLINIC | Age: 55
End: 2020-10-03

## 2020-10-03 DIAGNOSIS — Z20.822 SUSPECTED 2019 NOVEL CORONAVIRUS INFECTION: ICD-10-CM

## 2020-10-06 ENCOUNTER — COMMUNICATION - HEALTHEAST (OUTPATIENT)
Dept: ADMINISTRATIVE | Facility: CLINIC | Age: 55
End: 2020-10-06

## 2020-10-06 ENCOUNTER — COMMUNICATION - HEALTHEAST (OUTPATIENT)
Dept: SCHEDULING | Facility: CLINIC | Age: 55
End: 2020-10-06

## 2020-10-13 ENCOUNTER — RECORDS - HEALTHEAST (OUTPATIENT)
Dept: HEALTH INFORMATION MANAGEMENT | Facility: CLINIC | Age: 55
End: 2020-10-13

## 2020-11-01 ENCOUNTER — COMMUNICATION - HEALTHEAST (OUTPATIENT)
Dept: FAMILY MEDICINE | Facility: CLINIC | Age: 55
End: 2020-11-01

## 2020-11-11 ENCOUNTER — VIRTUAL VISIT (OUTPATIENT)
Dept: FAMILY MEDICINE | Facility: OTHER | Age: 55
End: 2020-11-11

## 2020-11-11 NOTE — PROGRESS NOTES
"Date: 2020 16:22:17  Clinician: Roni Barkley  Clinician NPI: [id number]  Patient: Myah Soares  Patient : 1965  Patient Address: [address] [place] [address] [address]. [address], [place] [address]  Patient Phone: [phone]  Visit Protocol: URI  Patient Summary:  Myah is a 55 year old ( : 1965 ) female who initiated a [facility] Visit for COVID-19 (Coronavirus) evaluation and screening. When asked the question \"Please sign me up to receive news, health information and promotions. \", Myah responded \"No\".    Myah states her symptoms started 1-2 days ago.   Her symptoms consist of myalgia and a cough.   Symptom details   Cough: Myah coughs every 5-10 minutes and her cough is not more bothersome at night. Phlegm does not come into her throat when she coughs. She does not believe her cough is caused by post-nasal drip.    Myah denies having vomiting, rhinitis, facial pain or pressure, chills, malaise, sore throat, teeth pain, ageusia, diarrhea, ear pain, headache, wheezing, fever, nasal congestion, nausea, and anosmia. She also denies taking antibiotic medication in the past month and having recent facial or sinus surgery in the past 60 days. She is not experiencing dyspnea.   Precipitating events  She has not recently been exposed to someone with influenza. Myah has been in close contact with the following high risk individuals: adults 65 or older and people with asthma, heart disease or diabetes.   Pertinent COVID-19 (Coronavirus) information  Myah does not work or volunteer as healthcare worker or a . In the past 14 days, Myah has not worked or volunteered at a healthcare facility or group living setting.   In the past 14 days, she also has not lived in a congregate living setting.   Myah has not had a close contact with a laboratory-confirmed COVID-19 patient within 14 days of symptom onset.    Since 2019, Myah has been tested for COVID-19 and has had upper respiratory infection (URI) " or influenza-like illness.      Result of COVID-19 test: Negative     Date of her COVID-19 test: 10/03/2020     Date(s) of previous URI or influenza-like illness (free-text): 10/1/2020 - 10/7/2020     Symptoms Myah experienced during previous URI or influenza-like illness as reported by the patient (free-text): Cough, sore throat        Pertinent medical history  Myah does not get yeast infections when she takes antibiotics.   Myah does not need a return to work/school note.   Weight: 155 lbs   Myah does not smoke or use smokeless tobacco.   Weight: 155 lbs    MEDICATIONS: levothyroxine oral, ALLERGIES: NKDA  Clinician Response:  Dear Myah,   Your symptoms show that you may have coronavirus (COVID-19). This illness can cause fever, cough and trouble breathing. Many people get a mild case and get better on their own. Some people can get very sick.  Based on the symptoms you have shared, I would like you to be re-checked in 2 to 3 days. Please call your family clinic to set up a video or phone visit.  Will I be tested for COVID-19?  We would like to test you for this virus.   Please call [phone] to schedule your visit. Explain that you were referred by [facility] to have a COVID-19 test. Be ready to share your [facility] visit ID number.   * If you need to schedule in [place] or [facility] please call [phone] or for [employer] employees please call [phone].    The following will serve as your written order for this COVID Test, ordered by me, for the indication of suspected COVID [Z20.828]: The test will be ordered in [facility], our electronic health record, after you are scheduled. It will show as ordered and authorized by Lio Jensen MD.  Order: COVID-19 (Coronavirus) PCR for SYMPTOMATIC testing from [facility].   1.When it's time for your COVID test:   Stay at least 6 feet away from others. (If someone will drive you to your test, stay in the backseat, as far away from the  as you can.)   Cover your  "mouth and nose with a mask, tissue or washcloth.  Go straight to the testing site. Don't make any stops on the way there or back.      2.Starting now: Stay home and away from others (self-isolate) until:   You've had no fever---and no medicine that reduces fever---for one full day (24 hours). And...   Your other symptoms have gotten better. For example, your cough or breathing has improved. And...   At least 10 days have passed since your symptoms started.       During this time, don't leave the house except for testing or medical care.   Stay in your own room, even for meals. Use your own bathroom if you can.   Stay away from others in your home. No hugging, kissing or shaking hands. No visitors.  Don't go to work, school or anywhere else.    Clean \"high touch\" surfaces often (doorknobs, counters, handles, etc.). Use a household cleaning spray or wipes. You'll find a full list of  on the EPA website: www.epa.gov/pesticide-registration/list-n-disinfectants-use-against-sars-cov-2.   Cover your mouth and nose with a mask, tissue or washcloth to avoid spreading germs.  Wash your hands and face often. Use soap and water.  Caregivers in these groups are at risk for severe illness due to COVID-19:  o People 65 years and older  o People who live in a nursing home or long-term care facility  o People with chronic disease (lung, heart, cancer, diabetes, kidney, liver, immunologic)   o People who have a weakened immune system, including those who:   Are in cancer treatment  Take medicine that weakens the immune system, such as corticosteroids  Had a bone marrow or organ transplant  Have an immune deficiency  Have poorly controlled HIV or AIDS  Are obese (body mass index of 40 or higher)  Smoke regularly   o Caregivers should wear gloves while washing dishes, handling laundry and cleaning bedrooms and bathrooms.  o Use caution when washing and drying laundry: Don't shake dirty laundry, and use the warmest water setting " that you can.  o For more tips, go to www.cdc.gov/coronavirus/2019-ncov/downloads/10Things.pdf.      How can I take care of myself?   Get lots of rest. Drink extra fluids (unless a doctor has told you not to)   Take Tylenol (acetaminophen) for fever or pain. If you have liver or kidney problems, ask your family doctor if it's okay to take Tylenol.   Adults can take either:    650 mg (two 325 mg pills) every 4 to 6 hours, or...   1,000 mg (two 500 mg pills) every 8 hours as needed.    Note: Don't take more than 3,000 mg in one day. Acetaminophen is found in many medicines (both prescribed and over-the-counter medicines). Read all labels to be sure you don't take too much.   For children, check the Tylenol bottle for the right dose. The dose is based on the child's age or weight.    If you have other health problems (like cancer, heart failure, an organ transplant or severe kidney disease): Call your specialty clinic if you don't feel better in the next 2 days.       Know when to call 911. Emergency warning signs include:    Trouble breathing or shortness of breath Pain or pressure in the chest that doesn't go away Feeling confused like you haven't felt before, or not being able to wake up Bluish-colored lips or face  Where can I get more information?   [employer] -- About COVID-19: [email or web address]   CDC -- What to Do If You're Sick: www.cdc.gov/coronavirus/2019-ncov/about/steps-when-sick.html   CDC -- Ending Home Isolation: www.cdc.gov/coronavirus/2019-ncov/hcp/disposition-in-home-patients.html   CDC -- Caring for Someone: www.cdc.gov/coronavirus/2019-ncov/if-you-are-sick/care-for-someone.html   [facility] -- Interim Guidance for Hospital Discharge to Home: www.health.[facility].[place]/diseases/coronavirus/hcp/hospdischarge.pdf   [facility] clinical trials (COVID-19 research studies): clinicalaffairs.[facility]/umn-clinical-trials    Below are the COVID-19 hotlines at the [facility] of  Health ([facility]). Interpreters are available.    For health questions: Call 514-179-8054 or 1-884.962.7005 (7 a.m. to 7 p.m.) For questions about schools and childcare: Call 631-374-7064 or 1-383.507.8637 (7 a.m. to 7 p.m.)       Diagnosis: Cough  Diagnosis ICD: R05

## 2020-11-14 ENCOUNTER — AMBULATORY - HEALTHEAST (OUTPATIENT)
Dept: FAMILY MEDICINE | Facility: CLINIC | Age: 55
End: 2020-11-14

## 2020-11-14 DIAGNOSIS — Z20.822 SUSPECTED 2019 NOVEL CORONAVIRUS INFECTION: ICD-10-CM

## 2020-11-15 ENCOUNTER — AMBULATORY - HEALTHEAST (OUTPATIENT)
Dept: FAMILY MEDICINE | Facility: CLINIC | Age: 55
End: 2020-11-15

## 2020-11-15 DIAGNOSIS — Z20.822 SUSPECTED 2019 NOVEL CORONAVIRUS INFECTION: ICD-10-CM

## 2020-11-17 ENCOUNTER — COMMUNICATION - HEALTHEAST (OUTPATIENT)
Dept: SCHEDULING | Facility: CLINIC | Age: 55
End: 2020-11-17

## 2020-11-20 ENCOUNTER — VIRTUAL VISIT (OUTPATIENT)
Dept: FAMILY MEDICINE | Facility: OTHER | Age: 55
End: 2020-11-20

## 2020-11-20 NOTE — PROGRESS NOTES
"Date: 2020 10:22:36  Clinician: Asim Whiting  Clinician NPI: [id number]  Patient: Myah Soares  Patient : 1965  Patient Address: [address] [place] [address] [place] [address]  Patient Phone: [phone]  Visit Protocol: URI  Patient Summary:  Myah is a 55 year old ( : 1965 ) female who initiated a [facility] Visit for COVID-19 (Coronavirus) evaluation and screening. When asked the question \"Please sign me up to receive news, health information and promotions. \", Myah responded \"Yes\".    Myah states her symptoms started suddenly 5-6 days ago. After her symptoms started, they improved and then got worse again.   Her symptoms consist of myalgia, malaise, diarrhea, and nausea.   Myah denies having vomiting, rhinitis, facial pain or pressure, chills, sore throat, teeth pain, ageusia, ear pain, headache, wheezing, fever, cough, nasal congestion, and anosmia. She also denies taking antibiotic medication in the past month and having recent facial or sinus surgery in the past 60 days. She is not experiencing dyspnea.   Precipitating events  She has not recently been exposed to someone with influenza. Myah has been in close contact with the following high risk individuals: adults 65 or older.   Pertinent COVID-19 (Coronavirus) information  Myah does not work or volunteer as healthcare worker or a . In the past 14 days, Myah has not worked or volunteered at a healthcare facility or group living setting.   In the past 14 days, she also has not lived in a congregate living setting.   Myah has not had a close contact with a laboratory-confirmed COVID-19 patient within 14 days of symptom onset.    Since 2019, Myah has been tested for COVID-19 and has had upper respiratory infection (URI) or influenza-like illness.      Result of COVID-19 test: Negative     Date of her COVID-19 test: 2020     Date(s) of previous URI or influenza-like illness (free-text): 2020     " Symptoms Myah experienced during previous URI or influenza-like illness as reported by the patient (free-text): Cough, achiness, sore throat        Pertinent medical history  Myah does not get yeast infections when she takes antibiotics.   Myah does not need a return to work/school note.   Weight: 155 lbs   Myah does not smoke or use smokeless tobacco.   Additional information as reported by the patient (free text): I've had diarrhea for the last five days (a little better on Tuesday), which is very unusual for me.   Weight: 155 lbs    MEDICATIONS: levothyroxine oral, ALLERGIES: NKDA  Clinician Response:  Dear Myah,   Your symptoms show that you may have coronavirus (COVID-19). This illness can cause fever, cough and trouble breathing. Many people get a mild case and get better on their own. Some people can get very sick.  What should I do?  We would like to test you for this virus.   1. Please call [phone] to schedule your visit. Explain that you were referred by [facility] to have a COVID-19 test. Be ready to share your [facility] visit ID number.  * If you need to schedule in [facility] please call [phone] or for [employer] employees please call [phone].  * If you need to schedule in the [place] area please call [phone]. [employer] employees call [phone].  The following will serve as your written order for this COVID Test, ordered by me, for the indication of suspected COVID [Z20.828]: The test will be ordered in [facility], our electronic health record, after you are scheduled. It will show as ordered and authorized by Lio Jensen MD.  Order: COVID-19 (Coronavirus) PCR for SYMPTOMATIC testing [facility]   2. When it's time for your COVID test:  Stay at least 6 feet away from others. (If someone will drive you to your test, stay in the backseat, as far away from the  as you can.)   Cover your mouth and nose with a mask, tissue or washcloth.  Go straight to the testing site. Don't make any  "stops on the way there or back.      3.Starting now: Stay home and away from others (self-isolate) until:   You've had no fever---and no medicine that reduces fever---for one full day (24 hours). And...   Your other symptoms have gotten better. For example, your cough or breathing has improved. And...   At least 10 days have passed since your symptoms started.       During this time, don't leave the house except for testing or medical care.   Stay in your own room, even for meals. Use your own bathroom if you can.   Stay away from others in your home. No hugging, kissing or shaking hands. No visitors.  Don't go to work, school or anywhere else.    Clean \"high touch\" surfaces often (doorknobs, counters, handles, etc.). Use a household cleaning spray or wipes. You'll find a full list of  on the EPA website: www.epa.gov/pesticide-registration/list-n-disinfectants-use-against-sars-cov-2.   Cover your mouth and nose with a mask, tissue or washcloth to avoid spreading germs.  Wash your hands and face often. Use soap and water.  Caregivers in these groups are at risk for severe illness due to COVID-19:  o People 65 years and older  o People who live in a nursing home or long-term care facility  o People with chronic disease (lung, heart, cancer, diabetes, kidney, liver, immunologic)  o People who have a weakened immune system, including those who:   Are in cancer treatment  Take medicine that weakens the immune system, such as corticosteroids  Had a bone marrow or organ transplant  Have an immune deficiency  Have poorly controlled HIV or AIDS  Are obese (body mass index of 40 or higher)  Smoke regularly   o Caregivers should wear gloves while washing dishes, handling laundry and cleaning bedrooms and bathrooms.  o Use caution when washing and drying laundry: Don't shake dirty laundry, and use the warmest water setting that you can.  o For more tips, go to www.cdc.gov/coronavirus/2019-ncov/downloads/10Things.pdf.    " 4.Sign up for Shyann [facility]. We know it's scary to hear that you might have COVID-19. We want to track your symptoms to make sure you're okay over the next 2 weeks. Please look for an email from Shyann Cintron---this is a free, online program that we'll use to keep in touch. To sign up, follow the link in the email. Learn more at [email or web address]  How can I take care of myself?   Get lots of rest. Drink extra fluids (unless a doctor has told you not to).   Take Tylenol (acetaminophen) for fever or pain. If you have liver or kidney problems, ask your family doctor if it's okay to take Tylenol.   Adults can take either:    650 mg (two 325 mg pills) every 4 to 6 hours, or...   1,000 mg (two 500 mg pills) every 8 hours as needed.    Note: Don't take more than 3,000 mg in one day. Acetaminophen is found in many medicines (both prescribed and over-the-counter medicines). Read all labels to be sure you don't take too much.   For children, check the Tylenol bottle for the right dose. The dose is based on the child's age or weight.    If you have other health problems (like cancer, heart failure, an organ transplant or severe kidney disease): Call your specialty clinic if you don't feel better in the next 2 days.       Know when to call 911. Emergency warning signs include:    Trouble breathing or shortness of breath Pain or pressure in the chest that doesn't go away Feeling confused like you haven't felt before, or not being able to wake up Bluish-colored lips or face.  Where can I get more information?    [employer] -- About COVID-19: [email or web address]   CDC -- What to Do If You're Sick: www.cdc.gov/coronavirus/2019-ncov/about/steps-when-sick.html   CDC -- Ending Home Isolation: www.cdc.gov/coronavirus/2019-ncov/hcp/disposition-in-home-patients.html   CDC -- Caring for Someone: www.cdc.gov/coronavirus/2019-ncov/if-you-are-sick/care-for-someone.html   [employer] -- Interim Guidance for Hospital  Discharge to Home: www.health.[facility].[place].us/diseases/coronavirus/hcp/hospdischarge.pdf   [facility] clinical trials (COVID-19 research studies): clinicalaffairs.[facility]-clinical-trials    Below are the COVID-19 hotlines at the [place] Department of Health ([facility]). Interpreters are available.    For health questions: Call 833-093-1456 or 1-943.683.2825 (7 a.m. to 7 p.m.) For questions about schools and childcare: Call 075-127-2442 or 1-921.806.5525 (7 a.m. to 7 p.m.)    COVID-19 (Coronavirus) General Information  Because there is currently no vaccine to prevent infection, the best way to protect yourself is to avoid being exposed to this virus. Common symptoms of COVID-19 include but are not limited to fever, cough, and shortness of breath. These symptoms appear 2-14 days after you are exposed to the virus that causes COVID-19. Click here for more information from the CDC on how to protect yourself.  If you are sick with COVID-19 or suspect you are infected with the virus that causes COVID-19, follow the steps here from the CDC to help prevent the disease from spreading to people in your home and community.  Click here for general information from the CDC on testing.  If you develop any of these emergency warning signs for COVID-19, get medical attention immediately:     Trouble breathing    Persistent pain or pressure in the chest    New confusion or inability to arouse    Bluish lips or face      Call your doctor or clinic before going in. Call [phone] if you have a medical emergency and notify the  you have or think you may have COVID-19.  For more detailed and up to date information on COVID-19 (Coronavirus), please visit the CDC website.   Diagnosis: Other malaise  Diagnosis ICD: R53.81

## 2020-11-23 ENCOUNTER — AMBULATORY - HEALTHEAST (OUTPATIENT)
Dept: FAMILY MEDICINE | Facility: CLINIC | Age: 55
End: 2020-11-23

## 2020-11-23 DIAGNOSIS — Z20.822 SUSPECTED 2019 NOVEL CORONAVIRUS INFECTION: ICD-10-CM

## 2020-11-25 ENCOUNTER — COMMUNICATION - HEALTHEAST (OUTPATIENT)
Dept: SCHEDULING | Facility: CLINIC | Age: 55
End: 2020-11-25

## 2020-11-30 ENCOUNTER — OFFICE VISIT - HEALTHEAST (OUTPATIENT)
Dept: FAMILY MEDICINE | Facility: CLINIC | Age: 55
End: 2020-11-30

## 2020-11-30 DIAGNOSIS — R19.7 DIARRHEA, UNSPECIFIED TYPE: ICD-10-CM

## 2020-12-19 ENCOUNTER — VIRTUAL VISIT (OUTPATIENT)
Dept: FAMILY MEDICINE | Facility: OTHER | Age: 55
End: 2020-12-19

## 2020-12-19 NOTE — PROGRESS NOTES
"Date: 2020 16:03:22  Clinician: Richardson Escobedo  Clinician NPI: [id number]  Patient: Myah Soares  Patient : 1965  Patient Address: [address] [place] [address] [place] [address]  Patient Phone: [phone]  Visit Protocol: URI  Patient Summary:  Myah is a 55 year old ( : 1965 ) female who initiated a [facility] Visit for COVID-19 (Coronavirus) evaluation and screening. When asked the question \"Please sign me up to receive news, health information and promotions. \", Myah responded \"No\".    Myah states her symptoms started 1-2 days ago.   Her symptoms consist of diarrhea, myalgia, a cough, malaise, and rhinitis.   Symptom details     Nasal secretions: The color of her mucus is clear.    Cough: Myah coughs a few times an hour and her cough is not more bothersome at night. Phlegm does not come into her throat when she coughs. She does not believe her cough is caused by post-nasal drip.      Myah denies having facial pain or pressure, anosmia, ear pain, headache, wheezing, fever, nasal congestion, nausea, chills, sore throat, teeth pain, ageusia, and vomiting. She also denies having recent facial or sinus surgery in the past 60 days and taking antibiotic medication in the past month. She is not experiencing dyspnea.   Precipitating events  She has not recently been exposed to someone with influenza. Myah has been in close contact with the following high risk individuals: adults 65 or older and people with asthma, heart disease or diabetes.   Pertinent COVID-19 (Coronavirus) information  Myah does not work or volunteer as healthcare worker or a . In the past 14 days, Myah has not worked or volunteered at a healthcare facility or group living setting.   In the past 14 days, she also has not lived in a congregate living setting.   Myah has not had a close contact with a laboratory-confirmed COVID-19 patient within 14 days of symptom onset.    Myah has been tested for COVID-19.      " Date(s) of her COVID-19 test as reported by the patient (free text): 06/09/20, 06/19/20, 08/15/20,10/03/20,11/15/20,11/23/20       Result of COVID-19 test as reported by the patient (free text): Negative       Type of test as reported by the patient (free text): Nasal PCR        Pertinent medical history  She has not been told by her provider to avoid NSAIDs.   Myah does not get yeast infections when she takes antibiotics.   Myah does not have diabetes. She denies having immunosuppressive conditions (e.g., chemotherapy, HIV, organ transplant, long-term use of steroids or other immunosuppressive medications, splenectomy). She denies having congestive heart failure and severe COPD. She does not have asthma.   Myah does not need a return to work/school note.   Myah does not smoke or use smokeless tobacco.   Weight: 155 lbs    MEDICATIONS: levothyroxine oral, ALLERGIES: NKDA  Clinician Response:  Dear Myah,   Your symptoms show that you may have coronavirus (COVID-19). This illness can cause fever, cough and trouble breathing. Many people get a mild case and get better on their own. Some people can get very sick.  What should I do?  We would like to test you for this virus.   1. Please call [phone] to schedule your visit. Explain that you were referred by [facility] to have a COVID-19 test. Be ready to [facility].  * If you need to schedule in [facility] please call [phone] or for [employer] employees please call [phone].  * If you need to schedule in the [place] area please call [phone]. [employer] employees call [phone].  The following will serve as your written order for this COVID Test, ordered by me, for the indication of suspected COVID [Z20.828]: The test will be ordered in [employer], our electronic health record, after you are scheduled. It will show as ordered and authorized by Lio Jensen MD.  Order: COVID-19 (Coronavirus) PCR for SYMPTOMATIC testing from [facility].   2.  "When it's time for your COVID test:  Stay at least 6 feet away from others. (If someone will drive you to your test, stay in the backseat, as far away from the  as you can.)   Cover your mouth and nose with a mask, tissue or washcloth.  Go straight to the testing site. Don't make any stops on the way there or back.      3.Starting now: Stay home and away from others (self-isolate) until:   You've had no fever---and no medicine that reduces fever---for one full day (24 hours). And...   Your other symptoms have gotten better. For example, your cough or breathing has improved. And...   At least 10 days have passed since your symptoms started.       During this time, don't leave the house except for testing or medical care.   Stay in your own room, even for meals. Use your own bathroom if you can.   Stay away from others in your home. No hugging, kissing or shaking hands. No visitors.  Don't go to work, school or anywhere else.    Clean \"high touch\" surfaces often (doorknobs, counters, handles, etc.). Use a household cleaning spray or wipes. You'll find a full list of  on the EPA website: www.epa.gov/pesticide-registration/list-n-disinfectants-use-against-sars-cov-2.   Cover your mouth and nose with a mask, tissue or washcloth to avoid spreading germs.  Wash your hands and face often. Use soap and water.  Caregivers in these groups are at risk for severe illness due to COVID-19:  o People 65 years and older  o People who live in a nursing home or long-term care facility  o People with chronic disease (lung, heart, cancer, diabetes, kidney, liver, immunologic)  o People who have a weakened immune system, including those who:   Are in cancer treatment  Take medicine that weakens the immune system, such as corticosteroids  Had a bone marrow or organ transplant  Have an immune deficiency  Have poorly controlled HIV or AIDS  Are obese (body mass index of 40 or higher)  Smoke regularly   o Caregivers should wear " gloves while washing dishes, handling laundry and cleaning bedrooms and bathrooms.  o Use caution when washing and drying laundry: Don't shake dirty laundry, and use the warmest water setting that you can.  o For more tips, go to www.cdc.gov/coronavirus/2019-ncov/downloads/10Things.pdf.    4.Sign up for [employer]. We know it's scary to hear that you might have COVID-19. We want to track your symptoms to make sure you're okay over the next 2 weeks. Please look for an email from [employer]---this is a free, online program that we'll use to keep in touch. To sign up, follow the link in the email. Learn more at [email or web address]  How can I take care of myself?   Get lots of rest. Drink extra fluids (unless a doctor has told you not to).   Take Tylenol (acetaminophen) for fever or pain. If you have liver or kidney problems, ask your family doctor if it's okay to take Tylenol.   Adults can take either:    650 mg (two 325 mg pills) every 4 to 6 hours, or...   1,000 mg (two 500 mg pills) every 8 hours as needed.    Note: Don't take more than 3,000 mg in one day. Acetaminophen is found in many medicines (both prescribed and over-the-counter medicines). Read all labels to be sure you don't take too much.   For children, check the Tylenol bottle for the right dose. The dose is based on the child's age or weight.    If you have other health problems (like cancer, heart failure, an organ transplant or severe kidney disease): Call your specialty clinic if you don't feel better in the next 2 days.       Know when to call 911. Emergency warning signs include:    Trouble breathing or shortness of breath Pain or pressure in the chest that doesn't go away Feeling confused like you haven't felt before, or not being able to wake up Bluish-colored lips or face.  Where can I get more information?    [facility] -- About COVID-19: [email or web address]   CDC -- What to Do If You're Sick:  www.cdc.gov/coronavirus/2019-ncov/about/steps-when-sick.html   CDC -- Ending Home Isolation: www.cdc.gov/coronavirus/2019-ncov/hcp/disposition-in-home-patients.html   [facility] -- Caring for Someone: www.cdc.gov/coronavirus/2019-ncov/if-you-are-sick/care-for-someone.html   [facility] -- Interim Guidance for Hospital Discharge to Home: www.[facility].[place]./diseases/coronavirus/hcp/hospdischarge.pdf   [facility] clinical trials (COVID-19 research studies): clinicalaffairs.[facility]-clinical-trials    Below are the COVID-19 hotlines at the [place] Department of Health ([facility]). Interpreters are available.    For health questions: Call 833-867-4054 or 1-801.351.3346 (7 a.m. to 7 p.m.) For questions about schools and childcare: Call 343-203-9837 or 1-701.795.9047 (7 a.m. to 7 p.m.)    Diagnosis: Contact with and (suspected) exposure to other viral communicable diseases  Diagnosis ICD: Z20.828  Additional Clinician Notes:  If your symptoms are not improving or worsen, please go to one of our urgent care locations for evaluation and possible lab work.

## 2020-12-20 ENCOUNTER — AMBULATORY - HEALTHEAST (OUTPATIENT)
Dept: FAMILY MEDICINE | Facility: CLINIC | Age: 55
End: 2020-12-20

## 2020-12-20 DIAGNOSIS — Z20.822 SUSPECTED COVID-19 VIRUS INFECTION: ICD-10-CM

## 2021-01-04 ENCOUNTER — COMMUNICATION - HEALTHEAST (OUTPATIENT)
Dept: INTERNAL MEDICINE | Facility: CLINIC | Age: 56
End: 2021-01-04

## 2021-01-04 ENCOUNTER — OFFICE VISIT - HEALTHEAST (OUTPATIENT)
Dept: INTERNAL MEDICINE | Facility: CLINIC | Age: 56
End: 2021-01-04

## 2021-01-04 DIAGNOSIS — E03.9 ACQUIRED HYPOTHYROIDISM: ICD-10-CM

## 2021-01-04 DIAGNOSIS — Z13.29 SCREENING FOR ENDOCRINE, NUTRITIONAL, METABOLIC AND IMMUNITY DISORDER: ICD-10-CM

## 2021-01-04 DIAGNOSIS — R03.0 ELEVATED BLOOD PRESSURE READING WITHOUT DIAGNOSIS OF HYPERTENSION: ICD-10-CM

## 2021-01-04 DIAGNOSIS — Z00.00 ENCOUNTER FOR ANNUAL PHYSICAL EXAM: ICD-10-CM

## 2021-01-04 DIAGNOSIS — E61.1 IRON DEFICIENCY: ICD-10-CM

## 2021-01-04 DIAGNOSIS — Z13.0 SCREENING FOR ENDOCRINE, NUTRITIONAL, METABOLIC AND IMMUNITY DISORDER: ICD-10-CM

## 2021-01-04 DIAGNOSIS — E06.3 AUTOIMMUNE HYPOTHYROIDISM: ICD-10-CM

## 2021-01-04 DIAGNOSIS — Z13.228 SCREENING FOR ENDOCRINE, NUTRITIONAL, METABOLIC AND IMMUNITY DISORDER: ICD-10-CM

## 2021-01-04 DIAGNOSIS — Z13.21 SCREENING FOR ENDOCRINE, NUTRITIONAL, METABOLIC AND IMMUNITY DISORDER: ICD-10-CM

## 2021-01-04 DIAGNOSIS — Z11.51 SPECIAL SCREENING EXAMINATION FOR HUMAN PAPILLOMAVIRUS (HPV): ICD-10-CM

## 2021-01-04 DIAGNOSIS — G56.02 CARPAL TUNNEL SYNDROME OF LEFT WRIST: ICD-10-CM

## 2021-01-04 DIAGNOSIS — N93.9 VAGINAL BLEEDING: ICD-10-CM

## 2021-01-04 LAB
ANION GAP SERPL CALCULATED.3IONS-SCNC: 8 MMOL/L (ref 5–18)
BASOPHILS # BLD AUTO: 0 THOU/UL (ref 0–0.2)
BASOPHILS NFR BLD AUTO: 0 % (ref 0–2)
BUN SERPL-MCNC: 10 MG/DL (ref 8–22)
CALCIUM SERPL-MCNC: 9.4 MG/DL (ref 8.5–10.5)
CHLORIDE BLD-SCNC: 105 MMOL/L (ref 98–107)
CHOLEST SERPL-MCNC: 227 MG/DL
CO2 SERPL-SCNC: 24 MMOL/L (ref 22–31)
CREAT SERPL-MCNC: 0.74 MG/DL (ref 0.6–1.1)
EOSINOPHIL # BLD AUTO: 0.1 THOU/UL (ref 0–0.4)
EOSINOPHIL NFR BLD AUTO: 3 % (ref 0–6)
ERYTHROCYTE [DISTWIDTH] IN BLOOD BY AUTOMATED COUNT: 12.7 % (ref 11–14.5)
FASTING STATUS PATIENT QL REPORTED: YES
GFR SERPL CREATININE-BSD FRML MDRD: >60 ML/MIN/1.73M2
GLUCOSE BLD-MCNC: 84 MG/DL (ref 70–125)
HCT VFR BLD AUTO: 37.8 % (ref 35–47)
HDLC SERPL-MCNC: 56 MG/DL
HGB BLD-MCNC: 12.8 G/DL (ref 12–16)
LDLC SERPL CALC-MCNC: 151 MG/DL
LYMPHOCYTES # BLD AUTO: 1.7 THOU/UL (ref 0.8–4.4)
LYMPHOCYTES NFR BLD AUTO: 39 % (ref 20–40)
MCH RBC QN AUTO: 29.6 PG (ref 27–34)
MCHC RBC AUTO-ENTMCNC: 34 G/DL (ref 32–36)
MCV RBC AUTO: 87 FL (ref 80–100)
MONOCYTES # BLD AUTO: 0.3 THOU/UL (ref 0–0.9)
MONOCYTES NFR BLD AUTO: 6 % (ref 2–10)
NEUTROPHILS # BLD AUTO: 2.3 THOU/UL (ref 2–7.7)
NEUTROPHILS NFR BLD AUTO: 52 % (ref 50–70)
PLATELET # BLD AUTO: 180 THOU/UL (ref 140–440)
PMV BLD AUTO: 7.3 FL (ref 7–10)
POTASSIUM BLD-SCNC: 3.8 MMOL/L (ref 3.5–5)
RBC # BLD AUTO: 4.34 MILL/UL (ref 3.8–5.4)
SODIUM SERPL-SCNC: 137 MMOL/L (ref 136–145)
T4 FREE SERPL-MCNC: 0.9 NG/DL (ref 0.7–1.8)
TRIGL SERPL-MCNC: 99 MG/DL
TSH SERPL DL<=0.005 MIU/L-ACNC: 10.35 UIU/ML (ref 0.3–5)
WBC: 4.4 THOU/UL (ref 4–11)

## 2021-01-04 RX ORDER — LEVOTHYROXINE SODIUM 75 UG/1
75 TABLET ORAL DAILY
Qty: 90 TABLET | Refills: 3 | Status: SHIPPED | OUTPATIENT
Start: 2021-01-04 | End: 2022-01-10

## 2021-01-05 LAB
HPV SOURCE: NORMAL
HUMAN PAPILLOMA VIRUS 16 DNA: NEGATIVE
HUMAN PAPILLOMA VIRUS 18 DNA: NEGATIVE
HUMAN PAPILLOMA VIRUS FINAL DIAGNOSIS: NORMAL
HUMAN PAPILLOMA VIRUS OTHER HR: NEGATIVE
SPECIMEN DESCRIPTION: NORMAL

## 2021-01-11 LAB
BKR LAB AP ABNORMAL BLEEDING: NORMAL
BKR LAB AP BIRTH CONTROL/HORMONES: NORMAL
BKR LAB AP CERVICAL APPEARANCE: NORMAL
BKR LAB AP GYN ADEQUACY: NORMAL
BKR LAB AP GYN INTERPRETATION: NORMAL
BKR LAB AP HPV REFLEX: NORMAL
BKR LAB AP LMP: NORMAL
BKR LAB AP PATIENT STATUS: NORMAL
BKR LAB AP PREVIOUS ABNORMAL: 2017
BKR LAB AP PREVIOUS NORMAL: NORMAL
HIGH RISK?: YES
PATH REPORT.COMMENTS IMP SPEC: NORMAL
RESULT FLAG (HE HISTORICAL CONVERSION): NORMAL

## 2021-01-23 ENCOUNTER — OFFICE VISIT - HEALTHEAST (OUTPATIENT)
Dept: FAMILY MEDICINE | Facility: CLINIC | Age: 56
End: 2021-01-23

## 2021-01-23 DIAGNOSIS — Z20.822 SUSPECTED COVID-19 VIRUS INFECTION: ICD-10-CM

## 2021-01-24 ENCOUNTER — AMBULATORY - HEALTHEAST (OUTPATIENT)
Dept: FAMILY MEDICINE | Facility: CLINIC | Age: 56
End: 2021-01-24

## 2021-01-24 DIAGNOSIS — Z20.822 SUSPECTED COVID-19 VIRUS INFECTION: ICD-10-CM

## 2021-01-25 ENCOUNTER — COMMUNICATION - HEALTHEAST (OUTPATIENT)
Dept: INTERNAL MEDICINE | Facility: CLINIC | Age: 56
End: 2021-01-25

## 2021-01-25 LAB
SARS-COV-2 PCR COMMENT: NORMAL
SARS-COV-2 RNA SPEC QL NAA+PROBE: NEGATIVE
SARS-COV-2 VIRUS SPECIMEN SOURCE: NORMAL

## 2021-01-26 ENCOUNTER — COMMUNICATION - HEALTHEAST (OUTPATIENT)
Dept: SCHEDULING | Facility: CLINIC | Age: 56
End: 2021-01-26

## 2021-03-08 ENCOUNTER — COMMUNICATION - HEALTHEAST (OUTPATIENT)
Dept: INTERNAL MEDICINE | Facility: CLINIC | Age: 56
End: 2021-03-08

## 2021-03-08 ENCOUNTER — AMBULATORY - HEALTHEAST (OUTPATIENT)
Dept: LAB | Facility: CLINIC | Age: 56
End: 2021-03-08

## 2021-03-08 DIAGNOSIS — E03.9 ACQUIRED HYPOTHYROIDISM: ICD-10-CM

## 2021-03-08 LAB
T4 FREE SERPL-MCNC: 0.9 NG/DL (ref 0.7–1.8)
TSH SERPL DL<=0.005 MIU/L-ACNC: 2.59 UIU/ML (ref 0.3–5)

## 2021-04-06 ENCOUNTER — COMMUNICATION - HEALTHEAST (OUTPATIENT)
Dept: INTERNAL MEDICINE | Facility: CLINIC | Age: 56
End: 2021-04-06

## 2021-04-06 DIAGNOSIS — G56.02 CARPAL TUNNEL SYNDROME OF LEFT WRIST: ICD-10-CM

## 2021-04-22 ENCOUNTER — COMMUNICATION - HEALTHEAST (OUTPATIENT)
Dept: FAMILY MEDICINE | Facility: CLINIC | Age: 56
End: 2021-04-22

## 2021-05-12 ENCOUNTER — OFFICE VISIT - HEALTHEAST (OUTPATIENT)
Dept: INTERNAL MEDICINE | Facility: CLINIC | Age: 56
End: 2021-05-12

## 2021-05-12 DIAGNOSIS — G56.22 CUBITAL TUNNEL SYNDROME ON LEFT: ICD-10-CM

## 2021-05-12 DIAGNOSIS — Z01.818 PREOPERATIVE EXAMINATION: ICD-10-CM

## 2021-05-12 DIAGNOSIS — E61.1 IRON DEFICIENCY: ICD-10-CM

## 2021-05-12 DIAGNOSIS — E03.9 ACQUIRED HYPOTHYROIDISM: ICD-10-CM

## 2021-05-12 LAB
ANION GAP SERPL CALCULATED.3IONS-SCNC: 8 MMOL/L (ref 5–18)
BASOPHILS # BLD AUTO: 0 THOU/UL (ref 0–0.2)
BASOPHILS NFR BLD AUTO: 0 % (ref 0–2)
BUN SERPL-MCNC: 9 MG/DL (ref 8–22)
CALCIUM SERPL-MCNC: 9.7 MG/DL (ref 8.5–10.5)
CHLORIDE BLD-SCNC: 104 MMOL/L (ref 98–107)
CO2 SERPL-SCNC: 26 MMOL/L (ref 22–31)
CREAT SERPL-MCNC: 0.77 MG/DL (ref 0.6–1.1)
EOSINOPHIL # BLD AUTO: 0.1 THOU/UL (ref 0–0.4)
EOSINOPHIL NFR BLD AUTO: 2 % (ref 0–6)
ERYTHROCYTE [DISTWIDTH] IN BLOOD BY AUTOMATED COUNT: 13.2 % (ref 11–14.5)
GFR SERPL CREATININE-BSD FRML MDRD: >60 ML/MIN/1.73M2
GLUCOSE BLD-MCNC: 102 MG/DL (ref 70–125)
HCT VFR BLD AUTO: 31.7 % (ref 35–47)
HGB BLD-MCNC: 10.8 G/DL (ref 12–16)
IMM GRANULOCYTES # BLD: 0 THOU/UL
IMM GRANULOCYTES NFR BLD: 0 %
LYMPHOCYTES # BLD AUTO: 1.7 THOU/UL (ref 0.8–4.4)
LYMPHOCYTES NFR BLD AUTO: 38 % (ref 20–40)
MCH RBC QN AUTO: 29 PG (ref 27–34)
MCHC RBC AUTO-ENTMCNC: 34.1 G/DL (ref 32–36)
MCV RBC AUTO: 85 FL (ref 80–100)
MONOCYTES # BLD AUTO: 0.4 THOU/UL (ref 0–0.9)
MONOCYTES NFR BLD AUTO: 8 % (ref 2–10)
NEUTROPHILS # BLD AUTO: 2.3 THOU/UL (ref 2–7.7)
NEUTROPHILS NFR BLD AUTO: 51 % (ref 50–70)
PLATELET # BLD AUTO: 179 THOU/UL (ref 140–440)
PMV BLD AUTO: 9.2 FL (ref 7–10)
POTASSIUM BLD-SCNC: 4 MMOL/L (ref 3.5–5)
RBC # BLD AUTO: 3.73 MILL/UL (ref 3.8–5.4)
SODIUM SERPL-SCNC: 138 MMOL/L (ref 136–145)
WBC: 4.5 THOU/UL (ref 4–11)

## 2021-05-12 RX ORDER — FERROUS SULFATE 325(65) MG
1 TABLET ORAL 2 TIMES DAILY
Qty: 60 TABLET | Refills: 3 | Status: SHIPPED | OUTPATIENT
Start: 2021-05-12

## 2021-05-13 LAB
ATRIAL RATE - MUSE: 73 BPM
DIASTOLIC BLOOD PRESSURE - MUSE: NORMAL
INTERPRETATION ECG - MUSE: NORMAL
P AXIS - MUSE: 49 DEGREES
PR INTERVAL - MUSE: 156 MS
QRS DURATION - MUSE: 84 MS
QT - MUSE: 382 MS
QTC - MUSE: 420 MS
R AXIS - MUSE: 45 DEGREES
SYSTOLIC BLOOD PRESSURE - MUSE: NORMAL
T AXIS - MUSE: 54 DEGREES
VENTRICULAR RATE- MUSE: 73 BPM

## 2021-05-27 VITALS
DIASTOLIC BLOOD PRESSURE: 70 MMHG | SYSTOLIC BLOOD PRESSURE: 122 MMHG | HEART RATE: 76 BPM | WEIGHT: 159 LBS | OXYGEN SATURATION: 99 %

## 2021-05-30 NOTE — TELEPHONE ENCOUNTER
Call to patient regarding lab results.    Vitamin D is low.  Recommend doubling dose of Vitamin D3 to 2000 IU daily.    Lipid panel is improved.  Not in risk group for statin medication.  Continue with good diet and exercise.    TSH and TPO are elevated.  Consistent with autoimmune hypothyroidism.  Recommend starting replacement therapy.  Prescribed Levothyroxine 75 mcg once daily.  Discussed proper use and possible side effects of medication.  Take first think in the morning on an empty stomach.  Follow up with me in clinic in 6 weeks for recheck.  Patient alerted me that her CT scan of her neck in 2014 showed an enlarged thyroid.  This was never followed up on.  I would like to send her for an US of the thyroid.  Order placed.    Shonda Antunez NP

## 2021-05-31 VITALS — HEIGHT: 66 IN | BODY MASS INDEX: 25.27 KG/M2 | WEIGHT: 157.25 LBS

## 2021-06-01 NOTE — PROGRESS NOTES
Assessment/Plan:     1. Autoimmune hypothyroidism  Tolerating Levothyroxine well.  Remains asymptomatic.  Recheck labs and will change medication dose as needed.   - Thyroid [place]    2. Screen for colon cancer  - Cologmurphyrd        Subjective:     Myah Soares is a 53 y.o. female who presents for recheck of her thyroid.  Patient was started on levothyroxine about 6 weeks ago.  Patient had an elevated TSH found that her physical.  Her T4 was normal.  Her thyroid antibodies were elevated at > 2000.  Patient was started on 75 mcg daily of levothyroxine.  She tells me today that her sister has also recently been diagnosed with hypothyroidism.  She was not feeling terribly fatigued prior to this.  She was not experiencing any constipation, hair loss, or abnormal weight gain.  States she feels fine on the medication.  She is taking first thing in the morning on an empty stomach.  She has really not noticed any new symptoms.  We also checked an ultrasound of her thyroid, which showed evidence of thyroiditis.      The following portions of the patient's history were reviewed and updated as appropriate: allergies, current medications.    Review of Systems  A comprehensive review of systems was performed and was otherwise negative    Objective:     /82   Pulse 60   Temp 97.6  F (36.4  C)   Wt 161 lb 3.2 oz (73.1 kg)   BMI 26.83 kg/m      General Appearance: Alert, cooperative, no distress, appears stated age  Eyes: PERRL, conjunctiva/corneas clear, EOM's intact  Neck: Supple, symmetrical, trachea midline, no adenopathy;  thyroid: not enlarged, symmetric, no tenderness/mass/nodules  Lungs: Clear to auscultation bilaterally, respirations unlabored  Heart: Regular rate and rhythm, S1 and S2 normal, no murmur, rub, or gallop    Shonda Antunez NP

## 2021-06-02 ENCOUNTER — RECORDS - HEALTHEAST (OUTPATIENT)
Dept: ADMINISTRATIVE | Facility: CLINIC | Age: 56
End: 2021-06-02

## 2021-06-03 VITALS
BODY MASS INDEX: 26.83 KG/M2 | HEART RATE: 60 BPM | WEIGHT: 161.2 LBS | DIASTOLIC BLOOD PRESSURE: 82 MMHG | SYSTOLIC BLOOD PRESSURE: 118 MMHG | TEMPERATURE: 97.6 F

## 2021-06-03 VITALS — BODY MASS INDEX: 26.49 KG/M2 | WEIGHT: 159 LBS | HEIGHT: 65 IN

## 2021-06-04 NOTE — TELEPHONE ENCOUNTER
Refill Approved    Rx renewed per Medication Renewal Policy. Medication was last renewed on 9/9/19.    Yvonne Adams, [facility] Triage/Med Refill 12/11/2019     Requested Prescriptions   Pending Prescriptions Disp Refills     levothyroxine (SYNTHROID, LEVOTHROID) 50 MCG tablet [Pharmacy Med Name: LEVOTHYROXINE 50 MCG TABLET] 30 tablet 2     Sig: TAKE 1 TABLET BY MOUTH EVERY DAY       Thyroid Hormones Protocol Passed - 12/11/2019  3:38 AM        Passed - Provider visit in past 12 months or next 3 months     Last office visit with prescriber/PCP: 9/6/2019 Shonda Antunez NP OR same dept: 9/6/2019 Shonda Antunez NP OR same specialty: 9/6/2019 Shonda Anutnez NP  Last physical: 7/23/2019 Last MTM visit: Visit date not found   Next visit within 3 mo: Visit date not found  Next physical within 3 mo: Visit date not found  Prescriber OR PCP: Shonda Antunez NP  Last diagnosis associated with med order: 1. Autoimmune hypothyroidism  - levothyroxine (SYNTHROID, LEVOTHROID) 50 MCG tablet [Pharmacy Med Name: LEVOTHYROXINE 50 MCG TABLET]; TAKE 1 TABLET BY MOUTH EVERY DAY  Dispense: 30 tablet; Refill: 2    If protocol passes may refill for 12 months if within 3 months of last provider visit (or a total of 15 months).             Passed - TSH on file in past 12 months for patient age 12 & older     TSH   Date Value Ref Range Status   09/06/2019 0.18 (L) 0.30 - 5.00 uIU/mL Final

## 2021-06-05 VITALS
HEART RATE: 72 BPM | BODY MASS INDEX: 26.46 KG/M2 | DIASTOLIC BLOOD PRESSURE: 88 MMHG | WEIGHT: 159 LBS | SYSTOLIC BLOOD PRESSURE: 162 MMHG
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
## 2021-06-13 NOTE — PROGRESS NOTES
Provider E-Visit time total (minutes): 7 minutes  I reviewed the patient's symptoms what she has been taking recent medical issues, I think this is likely viral, I think a cause such as C. difficile is possible though I think less likely.  I would recommend diet as tolerated and she can retry Imodium at a lower dose.  She can then follow-up as needed.

## 2021-06-14 NOTE — PATIENT INSTRUCTIONS - HE
Dear Myah Soares,    Your symptoms show that you may have coronavirus (COVID-19). This illness can cause fever, cough and trouble breathing. Many people get a mild case and get better on their own. Some people can get very sick.    Will I be tested for COVID-19?  We would like to test you for Covid-19 virus. I have placed orders for this test.     To schedule: go to your [employer] home page and scroll down to the section that says  You have an appointment that needs to be scheduled  and click the large green button that says  Schedule Now  and follow the steps to find the next available openings.    If you are unable to complete these [employer] scheduling steps, please call [phone] to schedule your testing.     Return to work/school/ guidance:  Please let your workplace manager and staffing office know when your quarantine ends     We can t give you an exact date as it depends on the above. You can calculate this on your own or work with your manager/staffing office to calculate this. (For example if you were exposed on 10/4, you would have to quarantine for 14 full days. That would be through 10/18. You could return on 10/19.)      If you receive a positive COVID-19 test result, follow the guidance of the those who are giving you the results. Usually the return to work is 10 (or in some cases 20 days from symptom onset.) If you work at [facility], you must also be cleared by Employee Occupational Health and Safety to return to work.        If you receive a negative COVID-19 test result and did not have a high risk exposure to someone with a known positive COVID-19 test, you can return to work once you're free of fever for 24 hours without fever-reducing medication and your symptoms are improving or resolved.      If you receive a negative COVID-19 test and If you had a high risk exposure to someone who has tested positive for COVID-19 then you can return to work 14 days after your last contact  with the positive individual    Note: If you have ongoing exposure to the covid positive person, this quarantine period may be more than 14 days. (For example, if you are continued to be exposed to your child who tested positive and cannot isolate from them, then the quarantine of 7-14 days can't start until your child is no longer contagious. This is typically 10 days from onset of the child's symptoms. So the total duration may be 17-24 days in this case.)    Sign up for [employer].   We know it's scary to hear that you might have COVID-19. We want to track your symptoms to make sure you're okay over the next 2 weeks. Please look for an email from [employer]--this is a free, online program that we'll use to keep in touch. To sign up, follow the link in the email you will receive. Learn more at [email or web address]    How can I take care of myself?    Get lots of rest. Drink extra fluids (unless a doctor has told you not to)    Take Tylenol (acetaminophen) or ibuprofen for fever or pain. If you have liver or kidney problems, ask your family doctor if it's okay to take Tylenol o ibuprofen    If you have other health problems (like cancer, heart failure, an organ transplant or severe kidney disease): Call your specialty clinic if you don't feel better in the next 2 days.    Know when to call 911. Emergency warning signs include:  o Trouble breathing or shortness of breath  o Pain or pressure in the chest that doesn't go away  o Feeling confused like you haven't felt before, or not being able to wake up  o Bluish-colored lips or face    Where can I get more information?  [facility] - About COVID-19:   [email or web address]    CDC - What to Do If You're Sick:   www.cdc.gov/coronavirus/2019-ncov/about/steps-when-sick.html      Dear Myah Soares,    Your symptoms show that you may have coronavirus (COVID-19). This illness can cause fever, cough and trouble breathing. Many people get a  mild case and get better on their own. Some people can get very sick.    Will I be tested for COVID-19?  We would like to test you for Covid-19 virus. I have placed orders for this test.     For all employees or close contacts (except [place] and [employer] - see below), go to your [employer] home page and scroll down to the section that says  You have an appointment that needs to be scheduled  and click the large green button that says  Schedule Now  and follow the steps to find the next available opening.     If you are unable to complete these steps or if you cannot find any available times, please call [phone] to schedule employee testing.     [place] employees or close contacts, please call [phone].   [place] [facility]) employees or close contacts call [phone].    Return to work/school/ guidance:  Please let your workplace manager and staffing office know when your quarantine ends     We can t give you an exact date as it depends on the above. You can calculate this on your own or work with your manager/staffing office to calculate this. (For example if you were exposed on 10/4, you would have to quarantine for 14 full days. That would be through 10/18. You could return on 10/19.)      If you receive a positive COVID-19 test result, follow the guidance of the those who are giving you the results. Usually the return to work is 10 (or in some cases 20 days from symptom onset.) If you work at [employer] [facility], you must also be cleared by Employee Occupational Health and Safety to return to work.        If you receive a negative COVID-19 test result and did not have a high risk exposure to someone with a known positive COVID-19 test, you can return to work once you're free of fever for 24 hours without fever-reducing medication and your symptoms are improving or resolved.      If you receive a negative COVID-19 test and If you had a high risk exposure to someone who has tested positive for  COVID-19 then you can return to work 14 days after your last contact with the positive individual    Note: If you have ongoing exposure to the covid positive person, this quarantine period may be more than 14 days. (For example, if you are continued to be exposed to your child who tested positive and cannot isolate from them, then the quarantine of 7-14 days can't start until your child is no longer contagious. This is typically 10 days from onset of the child's symptoms. So the total duration may be 17-24 days in this case.)    Sign up for [employer].   We know it's scary to hear that you might have COVID-19. We want to track your symptoms to make sure you're okay over the next 2 weeks. Please look for an email from [employer]--this is a free, online program that we'll use to keep in touch. To sign up, follow the link in the email you will receive. Learn more at [email or web address]    How can I take care of myself?    Get lots of rest. Drink extra fluids (unless a doctor has told you not to)    Take Tylenol (acetaminophen) or ibuprofen for fever or pain. If you have liver or kidney problems, ask your family doctor if it's okay to take Tylenol o ibuprofen    If you have other health problems (like cancer, heart failure, an organ transplant or severe kidney disease): Call your specialty clinic if you don't feel better in the next 2 days.    Know when to call 911. Emergency warning signs include:  o Trouble breathing or shortness of breath  o Pain or pressure in the chest that doesn't go away  o Feeling confused like you haven't felt before, or not being able to wake up  o Bluish-colored lips or face    Where can I get more information?   [employer] - About COVID-19:   [email or web address]    CDC - What to Do If You're Sick:   www.cdc.gov/coronavirus/2019-ncov/about/steps-when-sick.html

## 2021-06-16 PROBLEM — E06.3 AUTOIMMUNE HYPOTHYROIDISM: Status: ACTIVE | Noted: 2019-07-25

## 2021-06-16 PROBLEM — R87.610 ATYPICAL SQUAMOUS CELLS OF UNDETERMINED SIGNIFICANCE ON CYTOLOGIC SMEAR OF CERVIX (ASC-US): Status: ACTIVE | Noted: 2017-07-27

## 2021-06-17 NOTE — PATIENT INSTRUCTIONS - HE
Patient Instructions by Shonda Antunez NP at 7/23/2019  9:20 AM     Author: Shonda Antunez NP Service: -- Author Type: Nurse Practitioner    Filed: 7/23/2019  9:18 AM Encounter Date: 7/23/2019 Status: Signed    : Shonda Antunez NP (Nurse Practitioner)       Patient Education     Prevention Guidelines, Women Ages 50 to 64  Screening tests and vaccines are an important part of managing your health. A screening test is done to find possible disorders or diseases in people who don't have any symptoms. The goal is to find a disease early so lifestyle changes can be made and you can be watched more closely to reduce the risk of disease, or to detect it early enough to treat it most effectively. Screening tests are not considered diagnostic, but are used to determine if more testing is needed. Health counseling is essential, too. Below are guidelines for these, for women ages 50 to 64. Talk with your healthcare provider to make sure youre up to date on what you need.  Screening Who needs it How often   Type 2 diabetes or prediabetes All women beginning at age 45 and women without symptoms at any age who are overweight or obese and have 1 or more additional risk factors for diabetes. At  least every 3 years   Type 2 diabetes or prediabetes All women diagnosed with gestational diabetes Lifelong testing every 3 years   Type 2 diabetes All women with prediabetes Every year   Alcohol misuse All women in this age group At routine exams   Blood pressure All women in this age group Yearly checkup if your blood pressure is normal  Normal blood pressure is less than 120/80 mm Hg  If your blood pressure reading is higher than normal, follow the advice of your healthcare provider   Breast cancer All women at average risk in this age group Yearly mammogram should be done until age 54. At age 55, switch to mammograms every other year or choose to continue yearly mammograms.  All women should be familiar with the potential  benefits and risks of breast cancer screening with mammograms.      Cervical cancer All women in this age group, except women who have had a complete hysterectomy Pap test every 3 years or Pap test with human papillomavirus (HPV) test every 5 years   Chlamydia Women at increased risk for infection At routine exams   Colorectal cancer All women in this age group Flexible sigmoidoscopy every 5 years, or colonoscopy every 10 years, or double-contrast barium enema every 5 years; yearly fecal occult blood test or fecal immunochemical test; or a stool DNA test as often as your health care provider advises; talk with your health care provider about which tests are best for you   Depression All women in this age group At routine exams   Gonorrhea Sexually active women at increased risk for infection At routine exams   Hepatitis C Anyone at increased risk; 1 time for those born between 1945 and 1965 At routine exams   High cholesterol or triglycerides All women in this age group who are at risk for coronary artery disease At least every 5 years   HIV All women At routine exams   Lung cancer Adults age 55 to 80 who have smoked Yearly screening in smokers with 30 pack-year history of smoking or who quit within 15 years   Obesity All women in this age group At routine exams   Osteoporosis Women who are postmenopausal Ask your healthcare provider   Syphilis Women at increased risk for infection - talk with your healthcare provider At routine exams   Tuberculosis Women at increased risk for infection - talk with your healthcare provider Ask your healthcare provider   Vision All women in this age group Ask your healthcare provider   Vaccine Who needs it How often   Chickenpox (varicella) All women in this age group who have no record of this infection or vaccine 2 doses; the second dose should be given at least 4 weeks after the first dose   Hepatitis A Women at increased risk for infection - talk with your healthcare provider 2  doses given at least 6 months apart   Hepatitis B Women at increased risk for infection - talk with your healthcare provider 3 doses over 6 months; second dose should be given 1 month after the first dose; the third dose should be given at least 2 months after the second dose and at least 4 months after the first dose   Haemophilus influenzaeType B (HIB) Women at increased risk for infection - talk with your healthcare provider 1 to 3 doses   Influenza (flu) All women in this age group Once a year   Measles, mumps, rubella (MMR) Women in this age group through their late 50s who have no record of these infections or vaccines 1 dose   Meningococcal Women at increased risk for infection - talk with your healthcare provider 1 or more doses   Pneumococcal conjugate vaccine (PCV13) and pneumococcal polysaccharide vaccine (PPSV23) Women at increased risk for infection - talk with your healthcare provider PCV13: 1 dose ages 19 to 65 (protects against 13 types of pneumococcal bacteria)  PPSV23: 1 to 2 doses through age 64, or 1 dose at 65 or older (protects against 23 types of pneumococcal bacteria)   Tetanus/diphtheria/pertussis (Td/Tdap) booster All women in this age group Td every 10 years, or a one-time dose of Tdap instead of a Td booster after age 18, then Td every 10 years   Zoster All women ages 60 and older 1 dose   Counseling Who needs it How often   BRCA gene mutation testing for breast and ovarian cancer susceptibility Women with increased risk for having gene mutation When your risk is known   Breast cancer and chemoprevention Women at high risk for breast cancer When your risk is known   Diet and exercise Women who are overweight or obese When diagnosed, and then at routine exams   Sexually transmitted infection prevention Women at increased risk for infection - talk with your healthcare provider At routine exams   Use of daily aspirin Women ages 55 and up in this age group who are at risk for cardiovascular  health problems such as stroke When your risk is known   Use of tobacco and the health effects it can cause All women in this age group Every exam   1American Cancer Society  Date Last Reviewed: 1/26/2016 2000-2017 [employer]. [address] [place] [address], [place] [address]. All rights reserved. This information is not intended as a substitute for professional medical care. Always follow your healthcare professional's instructions.
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
## 2021-06-18 NOTE — PATIENT INSTRUCTIONS - HE
Patient Instructions by Itzel Wick CNP at 1/4/2021  7:00 AM     Author: Itzel Wick CNP Service: -- Author Type: Nurse Practitioner    Filed: 1/4/2021  7:24 AM Encounter Date: 1/4/2021 Status: Addendum    : Itzel Wick CNP (Nurse Practitioner)    Related Notes: Original Note by Itzel Wick CNP (Nurse Practitioner) filed at 1/4/2021  7:23 AM       Your PAP smear is processing as well as your labs. They will be released via my chart once they are back. Your PAP can take up to 7 days to get back.    For the finger numbness and tingling, try Ibuprofen 600mg three times daily for the next week with food. Also consider getting a neutral wrist brace over the counter at [facility] or [facility] to help with your likely carpal tunnel.  Can use on over the next several weeks please follow-up for this.    Increase your levothyroxine to 75 mcg daily.  You will need to have your TSH rechecked in about 8 weeks.    You continue to have vaginal bleeding her period, please follow-up with Shonda.            Patient Education     Carpal Tunnel Syndrome Prevention Tips  Carpal tunnel syndrome is a painful condition in the hand and wrist. It occurs when there is too much pressure on the median nerve at the wrist. The median nerve runs from your forearm to the palm of your hand. It may get squeezed or pressed when it passes through the carpal tunnel from your wrist to your hand. You may then feel numbness, tingling, pain, or weakness in your hand and up your forearm.  Doing the same hand activities over and over can put you at higher risk for carpal tunnel syndrome. But you can reduce your risk. Learn how to change the way you use your hands. Below are tips for at home and on the job. Also follow the hand and wrist safety policies at your workplace.      Keep your wrist in a straight (neutral) position when exercising.      Keep your wrist in neutral  Keep a straight (neutral) wrist  position as often as you can. Dont use your wrist in a bent (flexed) position for long periods of time. This includes extended or twisted positions.  When you sleep, don't have your wrist flexed (don't sleep all curled up on your side). And don't put extra pressure on your wrist for long periods of time (don't sleep on your stomach with your hands under you).  Watch your   Dont use only your thumb and index finger to grasp or lift something. This can put stress on your wrist. When you can, use your whole hand and all its fingers to grasp an object.  Minimize repetition  Dont move your arms or hands the same way for long periods of time. And don't hold an object in the same way for long periods of time. Even simple, light tasks can cause injury this way. Instead, switch tasks or switch hands.  Rest your hands  Give your hands a break from time to time with a rest. Even a few minutes once an hour can help.  Reduce speed and force  Slow down when you do a forceful, repetitive motion. This gives your wrist time to recover from the effort. Use power tools to help reduce the force.  Strengthen the muscles  Weak muscles may lead to a poor wrist or arm position. Exercises will make your hand and arm muscles stronger. This can help you keep a better position.  Date Last Reviewed: 1/1/2018 2000-2019 [employer]. [address] [place] [address]. All rights reserved. This information is not intended as a substitute for professional medical care. Always follow your healthcare professional's instructions.           Patient Education     Understanding Carpal Tunnel Syndrome    The carpal tunnel is a narrow space inside the wrist. It is ringed by bone and a band of tough tissue called the transverse carpal ligament. A major nerve called the median nerve runs from the forearm into the hand through the carpal tunnel. Tendons also run through the carpal tunnel.  With carpal tunnel syndrome, the tendons  or nearby tissues within the carpal tunnel may swell or thicken. Or the transverse carpal ligament may harden and shorten. This narrows the space in the carpal tunnel and puts pressure on the median nerve. This pressure leads to tingling and numbness of the hand and wrist. In time, the condition can make even simple tasks hard to do.  What causes carpal tunnel syndrome?  Doctors arent entirely clear why the condition occurs. Certain things may make a person more likely to have it. These include:    Being female    Being pregnant    Being overweight    Having diabetes or rheumatoid arthritis  Symptoms of carpal tunnel syndrome  Symptoms often come and go. At first, symptoms may occur mainly at night. Later, they may be noticed during the day as well. They may get worse with activities such as driving, reading, typing, or holding a phone. Symptoms can include:    Tingling and numbness in the hand or wrist    Sharp pain that shoots up the arm or down to the fingers    Hand stiffness or cramping, especially in the morning    Trouble making a fist    Hand weakness and clumsiness  Treatment for carpal tunnel syndrome  Certain treatments help reduce the pressure on the median nerve and relieve symptoms. Choices for treatment may include one or more of the following:    Wrist splint. This involves wearing a special brace on the wrist and hand. The splint holds the wrist straight, in a neutral position. This helps keep the carpal tunnel as open as possible.    Cortisone shots. Cortisone is a medicine that helps reduce swelling. It is injected directly into the wrist. It helps shrink tissues inside the carpal tunnel. This relieves symptoms for a time.    Pain medicines. You may take over-the-counter or prescription medicines to help reduce swelling and relieve symptoms.    Surgery. If the condition doesnt respond to other treatments and doesnt go away on its own, you may need surgery. During surgery, the surgeon cuts the  transverse carpal ligament to relieve pressure on the median nerve.     When to call your healthcare provider  Call your healthcare provider right away if you have any of these:    Fever of 100.4 F (38 C) or higher, or as directed    Symptoms that dont get better, or get worse    New symptoms   Date Last Reviewed: 3/10/2016    7547-5650 [employer]. [address] [place], [place] [id number]. All rights reserved. This information is not intended as a substitute for professional medical care. Always follow your healthcare professional's instructions.

## 2021-06-18 NOTE — PATIENT INSTRUCTIONS - HE
"Patient Instructions by Jacky Edmonds MD at 11/30/2020  4:29 PM     Author: Jacky Edmonds MD Service: -- Author Type: Physician    Filed: 11/30/2020  4:29 PM Encounter Date: 11/30/2020 Status: Signed    : Jacky Edmonds MD (Physician)           Viral Diarrhea (Adult)    Diarrhea caused by a virus is often called viral gastroenteritis. Many people call it the \"stomach flu,\" but it has nothing to do with influenza. The virus that causes diarrhea affects the stomach and intestinal tract and usually lasts from 2 to 7 days. Diarrhea is the passing of loose, watery stools 3 or more times a day.  Symptoms  Along with diarrhea, you may have these symptoms:    Abdominal pain and cramping    Nausea and vomiting    Loss of bowel control    Fever and chills    Bloody stools  The danger from repeated diarrhea is dehydration. Dehydration is the loss of too much water and other fluids from the body without taking in enough to replace what is lost.  Antibiotics are not effective in this illness, but there are a number of things you can do at home that will help.  Home care  Follow these home care measures:    If symptoms are severe, rest at home for the next 24 hours or until you are feeling better.    Wash your hands with soap and water or alcohol-based  to prevent the spread of infection. Wash your hands after touching anyone who is sick.    Wash your hands after using the toilet and before meals. Clean the toilet after each use.  Food preparation:    People with diarrhea should not prepare food for others. When preparing foods, wash your hands after touching anyone who is sick.    Wash your hands after using cutting boards, countertops, and knives that have been in contact with raw food.    Keep uncooked meats away from cooked and ready-to-eat foods.  Medicines:    You may use acetaminophen or NSAIDS such as ibuprofen or naproxen to control fever unless another medicine was prescribed.  If you have " chronic liver or kidney disease or ever had a stomach ulcer or gastrointestinal bleeding, talk with your healthcare provider before using these medicines. Aspirin should never be used in anyone under 18 years of age who is ill with a fever. It may cause severe liver damage. Don't use NSAID medicines if you are already taking one for another condition (like arthritis) or are on aspirin (such as for heart disease or after a stroke).    Anti-diarrhea medicine should be taken for this condition only if advised by your healthcare provider. Sometimes anti-diarrhea medicine can make your condition worse.  Diet:    Water and clear liquids are important so you do not get dehydrated. Drink small amounts at a time, do not guzzle it down. If you are very dehydrated, sports drinks aren't a good choice. They have too much sugar and not enough electrolytes. In this case, commercially available products called oral rehydration solutions are best.    Caffeine, tobacco, and alcohol can make the diarrhea, cramping, and pain worse.    Do not force yourself to eat, especially if you have cramping, vomiting, or diarrhea. Do not eat large amounts at a time, even if you are hungry. It may make you feel worse.    If you eat, avoid fatty, greasy, spicy, or fried foods.    No dairy products, as they can make diarrhea worse.  During the first 24 Hours (the first full day) follow the diet below:    Beverages: Water, clear liquids, soft drinks without caffeine; ginger ale, mineral water (plain or flavored), decaffeinated tea and coffee.    Soups: Clear broth, consommé and bouillon    Desserts: Plain gelatin, popsicles and fruit juice bars  During the next 24 hours (the second day) you may add the following to the above if you have improved:    Hot cereal, plain toast, bread, rolls, crackers    Plain noodles, rice, mashed potatoes, chicken noodle or rice soup    Unsweetened canned fruit (avoid pineapple), bananas    Limit fat intake to less than  15 grams per day by avoiding margarine, butter, oils, mayonnaise, sauces, gravies, fried foods, peanut butter, meat, poultry and fish.    Limit fiber; avoid raw or cooked vegetables, fresh fruits (except bananas) and bran cereals.    Limit caffeine and chocolate. No spices or seasonings except salt.  During the next 24 hours    Gradually resume a normal diet, as you feel better and your symptoms improve.    If at any time the diarrhea or cramping gets worse, go back to the simpler diet (above) or to clear liquids.  Follow-up care  Follow up with your healthcare provider, or as advised. Call if you are not improving within 24 hours or if the diarrhea lasts more than one week. If a stool (diarrhea) sample was taken, you may call in 2 days (or as directed) for the results.  When to seek medical advice  Call your healthcare provider right away if any of the following occur:    Increasing abdominal pain or constant lower right abdominal pain    Continued vomiting (unable to keep liquids down)    Frequent diarrhea (more than 5 times a day)    Blood in vomit or stool (black or red color)    Reduced oral intake    Dark urine, reduced urine output    Weakness, dizziness    Drowsiness    Fever of 100.4 F (38 C) oral or higher, or as directed by your healthcare provider    New rash  Call 911  Call 911 if any of the following occur:    Trouble breathing    Confused    Severe drowsiness or trouble awakening    Fainting or loss of consciousness    Rapid heart rate    Seizure    Stiff neck  Date Last Reviewed: 11/16/2015 2000-2017 [employer]. [address] [place] [address], [place] [address]. All rights reserved. This information is not intended as a substitute for professional medical care. Always follow your healthcare professional's instructions.

## 2021-06-18 NOTE — PATIENT INSTRUCTIONS - HE
Patient Instructions by Itzel Wick CNP at 5/12/2021  2:00 PM     Author: Itzel Wick CNP Service: -- Author Type: Nurse Practitioner    Filed: 5/12/2021  2:23 PM Encounter Date: 5/12/2021 Status: Addendum    : Itzel Wick CNP (Nurse Practitioner)    Related Notes: Original Note by Itzel Wick CNP (Nurse Practitioner) filed at 5/12/2021  2:23 PM       Your EKG today was normal.    Discuss your Covid testing, likely 72 hours prior to surgery with [employer].    Your labs are processing this time, I will release results via [employer] if they are abnormal.  I do not suspect this will be the case.    Stop your vitamins 1 week prior to surgery.    No NSAIDs (Aleve, Advil, ibuprofen) 7 days prior to surgery.    Tylenol as needed for pain control.    Follow-up if having new or developing symptoms prior to surgery.  Patient Education     What is Cubital Tunnel Syndrome?  Cubital tunnel syndrome is a set of symptoms that may occur if the ulnar nerve in your elbow gets pinched. This may happen if you bend or lean on your elbows often.    Your cubital tunnel  The cubital tunnel is a groove in a bone near your elbow. This narrow groove provides a passage for the ulnar nerve, one of the main nerves in your arm. The ulnar nerve can cause funny bone pain if your elbow gets bumped. Your cubital tunnel helps protect this nerve as it passes through your elbow and down to your fingers.  Compressing the ulnar nerve  Bending your elbow compresses the ulnar nerve inside the cubital tunnel. The nerve can get inflamed (irritated) after constant bending and pinching or after getting hurt. Over time, this can lead to pain or numbness. The pain is often felt in your ring fingers and little fingers.     Bending your elbow as you hold a phone can cause problems over time.      What are its symptoms?    Numbness or tingling in the ring fingers and little fingers    Loss of finger or  hand strength    Inability to straighten fingers    Sharp, sudden pain when elbow is touched    Shrinking of hand muscles  The road to healing  You can keep cubital tunnel syndrome from flaring up. Keep your arm straight as much as you can, even while sleeping, to prevent pinching of the ulnar nerve. And use phone headsets and elbow pads. If you still have pain, tell your doctor.   Date Last Reviewed: 5/1/2018 2000-2019 [employer]. [address] [place] [address], [place] [id number]. All rights reserved. This information is not intended as a substitute for professional medical care. Always follow your healthcare professional's instructions.

## 2021-06-19 NOTE — LETTER
Letter by Shonda Antunez NP at      Author: Shonda Antunez NP Service: -- Author Type: --    Filed:  Encounter Date: 8/12/2019 Status: (Other)         Myah Soares  [address] [place] [address] [address] [place] [address]             August 12, 2019         Dear Ms. Soares,    Our records show that you are due for a colonoscopy.  We do want to inform you that there are a couple of other options besides the traditional colonoscopy that we offer.  If you would like to do the traditional colonoscopy, please contact a [facility] near you according to the card enclosed.  If you would like to do one of the other options available to you, please let you primary doctor know and they will get that ordered.  Enclosed is some information on the other options for you to read over.  If you have had any of these done at another facility, please arrange for us to receive those records so we can update your chart.    Please call with questions or contact us using [employer].    Sincerely,        Electronically signed by Shonda Antunez NP

## 2021-06-20 NOTE — LETTER
Letter by Tamia Reed RN at      Author: Tamia Reed RN Service: -- Author Type: --    Filed:  Encounter Date: 6/12/2020 Status: (Other)       6/12/2020        Myah LOUISE Fany  [address] [place] [address] [address] [place] [address]    This letter provides a written record that you were tested for COVID-19 on 6/9/20.     Your result was negative.    This means that we didnt find the virus that causes COVID-19 in your sample. A test may show negative when you do actually have the virus. This can happen when the virus is in the early stages of infection, before you feel illness symptoms.    Even if you dont have symptoms, they may still appear. For safety, its very important to follow these rules.    Keep yourself away from others (self-isolation):      Stay home. Dont go to work, school or anywhere else.     Stay in your own room (and use your own bathroom), if you can.    Stay away from others in your home. No hugging, kissing or shaking hands. No visitors.    Clean high touch surfaces often (doorknobs, counters, handles, etc.). Use a household cleaning spray or wipes.    Cover your mouth and nose with a mask, tissue or washcloth to avoid spreading germs.    Wash your hands and face often with soap and water.    Stay in self-isolation until you meet ALL of the guidelines below:    1. You have had no fever for at least 72 hours (that is 3 full days of no fever without the use of medicine that reduces fevers), AND  2. other symptoms (such as cough, shortness of breath) have gotten better, AND  3. at least 10 days have passed since your symptoms first appeared.    Going back to work  Check with your employer for any guidelines to follow for going back to work.    Employers: This document serves as formal notice that your employee tested negative for COVID-19, as of the testing date shown above.    For questions regarding this letter or your Negative COVID-19 result, call [phone] between 8A to 6:30P (M-F) and 10A  to 6:30P (weekends).

## 2021-06-20 NOTE — LETTER
Letter by Tamia Reed RN at      Author: Tamia Reed RN Service: -- Author Type: --    Filed:  Encounter Date: 6/22/2020 Status: (Other)       6/22/2020        Myah LOUISE Fany  [address] [place] [address]    This letter provides a written record that you were tested for COVID-19 on 6/19/20.     Your result was negative. This means that we didnt find the virus that causes COVID-19 in your sample. A test may show negative when you do actually have the virus. This can happen when the virus is in the early stages of infection, before you feel illness symptoms.    If you have symptoms   Stay home and away from others (self-isolate) until you meet ALL of the guidelines below:    Youve had no fever--and no medicine that reduces fever--for 3 full days (72 hours). And ?    Your other symptoms have gotten better. For example, your cough or breathing has improved. And?    At least 10 days have passed since your symptoms started.    During this time:    Stay home. Dont go to work, school or anywhere else.     Stay in your own room, including for meals. Use your own bathroom if you can.    Stay away from others in your home. No hugging, kissing or shaking hands. No visitors.    Clean high touch surfaces often (doorknobs, counters, handles, etc.). Use a household cleaning spray or wipes. You can find a full list on the EPA website at www.epa.gov/pesticide-registration/list-n-disinfectants-use-against-sars-cov-2.    Cover your mouth and nose with a mask, tissue or washcloth to avoid spreading germs.    Wash your hands and face often with soap and water.    Going back to work  Check with your employer for any guidelines to follow for going back to work.    Employers: This document serves as formal notice that your employee tested negative for COVID-19, as of the testing date shown above.

## 2021-06-27 NOTE — PROGRESS NOTES
Progress Notes by Shonda Antunez NP at 7/23/2019  9:20 AM     Author: Shonda Antunez NP Service: -- Author Type: Nurse Practitioner    Filed: 7/23/2019 10:20 AM Encounter Date: 7/23/2019 Status: Signed    : Shonda Antunez NP (Nurse Practitioner)       FEMALE PREVENTATIVE EXAM    Assessment and Plan:       1. Routine health maintenance  Healthy female exam    2. Visit for screening mammogram  - Mammo Screening Bilateral; Future    3. Screen for colon cancer  Does not want a colonoscopy. Discussed fecal occult testing versus Cologuard.  Will proceed with Cologuard.   - Cologuard    4. Screening for diabetes mellitus  - Comprehensive Metabolic Panel    5. Screening for lipid disorders  - Lipid [place] FASTING    6. Screening for malignant neoplasm of cervix  - Gynecologic Cytology (PAP Smear)    7. Screening for thyroid disorder  - Thyroid [place]    8. Vitamin D deficiency  On replacement  - Vitamin D, Total (25-Hydroxy)    9. Iron deficiency anemia, unspecified iron deficiency anemia type  On an iron supplement.  - HM2(CBC w/o Differential)    10. Need for hepatitis C screening test  - Hepatitis C Antibody (Anti-HCV)    11. Encounter for screening for HIV  - HIV Antigen/Antibody Screening [place]    12. Hip pain, right  Pain since fall in December.  Will xray today to r/o any chronic injury.  Assess for arthritis.  Recommend continuing Ibuprofen as needed for pain.  - XR Pelvis W 2 Vw Hip Right; Future     13. Perimenopausal symptoms  Discussed treatment with oral/topical hormones or low dose Effexor if symptoms become more bothersome.     Next follow up:  Return in about 1 year (around 7/23/2020) for Physical.    Immunization Review  Adult Imm Review: No immunizations due today    I discussed the following with the patient:   Adult Healthy Living: Importance of regular exercise  Healthy nutrition    I have had an Advance Directives discussion with the patient.    Subjective:   Chief Complaint: Myah LOUISE  Fany is an 53 y.o. female here for a preventative health visit.     HPI: Patient is  with 3 adult children.  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  Patient lives with her  and mother-in-law dementia.    Patient is fasting today.  Last Pap was 2 years ago, which showed ASCUS with negative HPV.  Patient's last menstrual period was in April, and was quite heavy.  She is starting to have a lot of menopausal symptoms including night sweats, hot flashes, and vaginal dryness.  [place] has become slightly painful.  Her sleep is disrupted secondary to the hot flashes and night sweats.  She has also noticed a mild weight gain since entering menopause.    Patient is due for a mammogram and colonoscopy.  She does not wish to have a colonoscopy, and would like to discuss other options.  No family history of colon cancer.    Patient is on a vitamin D and iron supplement.    Patient complains of right hip pain since a fall in December.  She mostly has discomfort when she lays down at night.  No big difficulties with walking.  She does go to a chiropractor and massage therapist.  Takes ibuprofen as needed.    Previous history of a herniated disc in her neck.  She was originally scheduled for surgery, but never had it.  Her neck pain is manageable.    Healthy Habits  Are you taking a daily aspirin? No  Do you typically exercising at least 40 min, 3-4 times per week?  Yes  Do you usually eat at least 4 servings of fruit and vegetables a day, include whole grains and fiber and avoid regularly eating high fat foods? Yes  Have you had an eye exam in the past two years? Yes  Do you see a dentist twice per year? Yes  Do you have any concerns regarding sleep? YES - hot flashes/night sweats    Safety Screen  If you own firearms, are they secured in a locked gun cabinet or with trigger locks? The patient does not own any firearms  Do you feel you are safe where you are living?: Yes (7/23/2019  9:18  "AM)  Do you feel you are safe in your relationship(s)?: Yes (7/23/2019  9:18 AM)      Review of Systems:  Please see above.  The rest of the review of systems are negative for all systems.     Pap History:   Yes - updated in Problem List and Health Maintenance accordingly  Cancer Screening       Status Date      COLOGUARD Overdue 10/4/2013     MAMMOGRAM Next Due 8/25/2019      Done 8/25/2017 MAMMO SCREENING BILATERAL     Patient has more history with this topic...    PAP SMEAR Next Due 7/27/2020      Done 7/27/2017 GYNECOLOGIC CYTOLOGY (PAP SMEAR)     Patient has more history with this topic...          Patient Care Team:  Shonda Antunez NP as PCP - General (Family Medicine)        History     Reviewed By Date/Time Sections Reviewed    Shonda Antunez NP 7/23/2019  9:27 AM Tobacco    Shonda Antunez NP 7/23/2019  9:25 AM Tobacco    Addis Hoffman CMA 7/23/2019  9:19 AM Tobacco            Objective:   Vital Signs:   Visit Vitals  /84 (Patient Site: Right Arm, Patient Position: Sitting, Cuff Size: Adult Regular)   Pulse 73   Ht 5' 5\" (1.651 m)   Wt 159 lb (72.1 kg)   LMP 04/12/2019   SpO2 98%   BMI 26.46 kg/m           PHYSICAL EXAM  General Appearance: Alert, cooperative, no distress, appears stated age  Head: Normocephalic, without obvious abnormality, atraumatic  Eyes: PERRL, conjunctiva/corneas clear, EOM's intact. Wearing corrective glasses.  Ears: Normal TM's and external ear canals, both ears  Nose: Nares normal, septum midline  Throat: Lips, mucosa, and tongue normal; teeth and gums normal  Neck: Supple, symmetrical, trachea midline, no adenopathy;  thyroid: not enlarged, symmetric, no tenderness/mass/nodules  Back: no CVA tenderness  Lungs: Clear to auscultation bilaterally, respirations unlabored  Breasts: No breast masses, tenderness, asymmetry, or nipple discharge.  Heart: Regular rate and rhythm, S1 and S2 normal, no murmur, rub, or gallop  Abdomen: Soft, non-tender, bowel sounds active " all four quadrants,  no masses, no organomegaly  Pelvic:Normally developed genitalia with no external lesions or eruptions. Vagina and cervix show no lesions, inflammation, discharge or tenderness. No cystocele, No rectocele. No adnexal mass or tenderness.  Extremities: Extremities normal, atraumatic, no cyanosis or edema  Skin: Skin color, texture, turgor normal, no rashes or lesions  Lymph nodes: Cervical, supraclavicular, and axillary nodes normal  Neurologic: Normal   Psychologic: appropriate affective, answers all of my questions appropriately. No hallucinations, delusion, or suicidal ideations.    Shonda Antunez, NP

## 2021-06-30 NOTE — PROGRESS NOTES
Progress Notes by Itzel Wick CNP at 1/4/2021  7:00 AM     Author: Itzel Wick CNP Service: -- Author Type: Nurse Practitioner    Filed: 1/4/2021  7:38 AM Encounter Date: 1/4/2021 Status: Signed    : Itzel Wick CNP (Nurse Practitioner)       FEMALE PREVENTATIVE EXAM    Assessment and Plan:     Patient has been advised of split billing requirements and indicates understanding: Yes    1. Encounter for annual physical exam: Completed today. Fasted labs drawn. PAP done.     2. Vaginal bleeding/Special screening examination for human papillomavirus (HPV): Hx of ASCUS PAP in 2017, normal in 2019. New onset vaginal bleeding (could be related to decreased stress over the holiday's). No painful intercourse. Cycle lasted from 12/22-12/27; moderate bleeding, some cramping. Will recheck her PAP today. Normal pelvic exam.  - Gynecologic Cytology (PAP Smear)  -Follow up if symptoms continue.     3. Autoimmune hypothyroidism/Acquired hypothyroidism: TSH was 7.01 in September. She reports fatigue, will increase dose today to 75 mcg daily of Levothyroxine. Recheck again in 8 weeks.   - levothyroxine (SYNTHROID, LEVOTHROID) 75 MCG tablet; Take 1 tablet (75 mcg total) by mouth daily.  Dispense: 90 tablet; Refill: 3  - Thyroid Stimulating Hormone (TSH)  - T4, Free  - Thyroid Stimulating Hormone (TSH); Future  - T4, Free; Future    4. Iron deficiency: hx of this, she is on iron supplement. Will recheck CBC today.   - HM1(CBC and Differential)    5. Carpal tunnel syndrome of left wrist: Started in December after painting her bathroom in her house, left pinky and ring finger numbness, tingling, wrist pain. Discussed Ice or heat, whichever feels better 15 minutes four times per day. Ibuprofen 600mg three times daily with food for the next 7 days. Try an OTC neutral wrist brace. Follow up if symptoms persist or worsen.     6. Elevated blood pressure reading without diagnosis of hypertension:  Blood pressure today in office was 164/92. Likely related to anxiety of coming into the office. Will discuss checking her blood pressure at home over my chart message once her labs are back. She is exercising 4-5 times per week, walking 30-60 minutes per day.     7. Screening for endocrine, nutritional, metabolic and immunity disorder: Will check fasted labs today.   - Lipid [place] FASTING  - Basic Metabolic Panel  - HM1(CBC and Differential)    Next follow up:  Return in about 1 year (around 1/4/2022) for Routine preventive.    Immunization Review  Adult Imm Review: No immunizations due today    I discussed the following with the patient:   Adult Healthy Living: Importance of regular exercise  Healthy nutrition      Subjective:   Chief Complaint: Myah Soares is an 55 y.o. female here for a preventative health visit.    Patient has been advised of split billing requirements and indicates understanding: Yes     HPI:  The patient presents today for her annual physical with fasted labs.    She reports that her TSH was high in September and would like her Levothyroxine dose increased today if she could. She reports feeling fatigued lately and would like this dose increased.     She reports that she painted a bathroom in her house in early December and has had some numbness and tingling in her left pinky and ring finger since.  She reports being in many awkward positions while painting and this started.  She reports she also uses a computer on her dining room table does not have the best ergonomics with this, and suspects that this is causing the pain in her wrist and numbness and tingling.  She reports that her pain is worse at bedtime, her wrist throbs.  She reports trialing some ibuprofen at bedtime to help with this, but the numbness and tingling continues.  She reports a pain score of 2-3 out of 10 at worst.    She also reports having a.  Starting on December 22.  She reports that her cycle was 5 days in length,  moderate bleeding, some cramping.  He denies any painful intercourse.  She reports that her last period was approximately 13 months ago and November 2019.  She reports that her mother-in-law who is been living with them who has Alzheimer's went to another family member's house over Juan, and she feels like her stress level was considerably less, causing her to have the period.  She reports that this is happened to her in the past.     She is due for a follow up PAP smear, last was done in 2019 was normal, 2017 showed ASCUS. We will repeat her pap today.    She denies any alcohol, drug, or tobacco use.    She does report that she walks approximately 4-5 times per week, 30 minutes to 1 hour outside.  She reports that nature helps her cope with everything that is going on currently.    She denies fever, chills, cough, shortness of breath, chest pain, chest pressure, nausea, vomiting, rhonda pain, urinary, or bowel symptoms today.    Healthy Habits  Are you taking a daily aspirin? No  Do you typically exercising at least 40 min, 3-4 times per week?  Yes  Do you usually eat at least 4 servings of fruit and vegetables a day, include whole grains and fiber and avoid regularly eating high fat foods? Yes  Have you had an eye exam in the past two years? Yes  Do you see a dentist twice per year? Yes  Do you have any concerns regarding sleep? No    Safety Screen  If you own firearms, are they secured in a locked gun cabinet or with trigger locks? The patient declines to answer  Do you feel you are safe where you are living?: Yes (1/4/2021  7:06 AM)  Do you feel you are safe in your relationship(s)?: Yes (1/4/2021  7:06 AM)      Review of Systems:  Please see above.  The rest of the review of systems are negative for all systems.     Pap History:   Yes - updated in Problem List and Health Maintenance accordingly  Cancer Screening       Status Date      PAP SMEAR Overdue 7/23/2020      Done 7/23/2019 GYNECOLOGIC CYTOLOGY  (PAP SMEAR)     Patient has more history with this topic...    MAMMOGRAM Next Due 7/25/2021      Done 7/25/2019 MAMMO SCREENING BILATERAL     Patient has more history with this topic...          Patient Care Team:  Shonda Antunez NP as PCP - General (Family Medicine)  Shonda Antunez NP as Assigned PCP  Itzel Wick, CNP.      History     Reviewed By Date/Time Sections Reviewed    Kacie Velasquez CMA 1/4/2021  7:26 AM Tobacco    Kacie Velasquez CMA 1/4/2021  7:05 AM Tobacco            Objective:   Vital Signs:   Visit Vitals  /88 (Patient Site: Right Arm, Patient Position: Sitting, Cuff Size: Adult Large)   Pulse 72   Wt 159 lb (72.1 kg)   LMP 12/22/2020 (Exact Date)   Breastfeeding No   BMI 26.46 kg/m         Physical Exam   Constitutional: She is oriented to person, place, and time and well-developed, well-nourished, and in no distress. No distress.   HENT:   Head: Normocephalic and atraumatic.   Right Ear: External ear normal.   Left Ear: External ear normal.   Nose: Nose normal.   Mouth/Throat: Oropharynx is clear and moist. No oropharyngeal exudate.   Eyes: Pupils are equal, round, and reactive to light. Conjunctivae and EOM are normal. No scleral icterus.   Neck: Normal range of motion. Neck supple. No thyromegaly present.   Cardiovascular: Normal rate, regular rhythm and normal heart sounds. Exam reveals no gallop and no friction rub.   No murmur heard.  Pulmonary/Chest: Effort normal and breath sounds normal. Right breast exhibits no inverted nipple, no mass, no nipple discharge, no skin change and no tenderness. Left breast exhibits no inverted nipple, no mass, no nipple discharge, no skin change and no tenderness. Breasts are symmetrical.   Abdominal: Soft. Bowel sounds are normal. She exhibits no distension and no mass. There is no abdominal tenderness. There is no rebound and no guarding.   Genitourinary:    Vulva, vagina, cervix, uterus, right adnexa and left adnexa  normal.      No vaginal discharge.     Musculoskeletal: Normal range of motion.         General: No tenderness or edema.   Lymphadenopathy:     She has no cervical adenopathy.   Neurological: She is alert and oriented to person, place, and time. No cranial nerve deficit. Gait normal. Coordination normal.   Skin: Skin is warm and dry. She is not diaphoretic.   Psychiatric: Mood, memory, affect and judgment normal.   Nursing note and vitals reviewed.           Medication List          Accurate as of January 4, 2021  7:32 AM. If you have any questions, ask your nurse or doctor.            CHANGE how you take these medications    levothyroxine 75 MCG tablet  Also known as: SYNTHROID, LEVOTHROID  INSTRUCTIONS: Take 1 tablet (75 mcg total) by mouth daily.  What changed:     medication strength    how much to take  Changed by: Itzel Wick CNP           CONTINUE taking these medications    CALCIUM 600-D3 PLUS (MAG-ZINC) ORAL  INSTRUCTIONS: Take 1 tablet by mouth daily.        ferrous sulfate 65 mg elemental iron  INSTRUCTIONS: Take 1 tablet by mouth daily.        multivitamin therapeutic tablet  Also known as: THERAGRAN  INSTRUCTIONS: Take 1 tablet by mouth daily.        vitamin C 250 MG tablet  INSTRUCTIONS: Take 250 mg by mouth daily.  Generic drug: ascorbic acid (vitamin C)           STOP taking these medications    cholecalciferol (vitamin D3) 1,000 unit (25 mcg) tablet  Stopped by: Itzel Wick CNP           Where to Get Your Medications      These medications were sent to [facility]/pharmacy #[id number] - [place], [place] - [address]  [address] [address]    Phone: [phone]     levothyroxine 75 MCG tablet         Additional Screenings Completed Today: none

## 2021-07-03 NOTE — ADDENDUM NOTE
Addendum Note by Shonda Antunez NP at 7/23/2019  9:20 AM     Author: Shonda Antunez NP Service: -- Author Type: Nurse Practitioner    Filed: 7/23/2019  7:11 PM Encounter Date: 7/23/2019 Status: Signed    : Shonda Antunez NP (Nurse Practitioner)    Addended by: SHONDA ANTUNEZ on: 7/23/2019 07:11 PM        Modules accepted: Orders

## 2021-09-04 ENCOUNTER — HEALTH MAINTENANCE LETTER (OUTPATIENT)
Age: 56
End: 2021-09-04

## 2021-10-24 ENCOUNTER — HEALTH MAINTENANCE LETTER (OUTPATIENT)
Age: 56
End: 2021-10-24

## 2022-01-09 ENCOUNTER — MYC MEDICAL ADVICE (OUTPATIENT)
Dept: INTERNAL MEDICINE | Facility: CLINIC | Age: 57
End: 2022-01-09

## 2022-01-09 DIAGNOSIS — E06.3 AUTOIMMUNE HYPOTHYROIDISM: ICD-10-CM

## 2022-01-10 RX ORDER — LEVOTHYROXINE SODIUM 75 UG/1
75 TABLET ORAL DAILY
Qty: 30 TABLET | Refills: 0 | Status: SHIPPED | OUTPATIENT
Start: 2022-01-10

## 2022-01-11 RX ORDER — LEVOTHYROXINE SODIUM 75 UG/1
TABLET ORAL
Qty: 30 TABLET | Refills: 11 | OUTPATIENT
Start: 2022-01-11

## 2022-02-13 ENCOUNTER — HEALTH MAINTENANCE LETTER (OUTPATIENT)
Age: 57
End: 2022-02-13

## 2022-10-16 ENCOUNTER — HEALTH MAINTENANCE LETTER (OUTPATIENT)
Age: 57
End: 2022-10-16

## 2023-03-26 ENCOUNTER — HEALTH MAINTENANCE LETTER (OUTPATIENT)
Age: 58
End: 2023-03-26

## 2023-11-04 ENCOUNTER — HEALTH MAINTENANCE LETTER (OUTPATIENT)
Age: 58
End: 2023-11-04

## 2023-11-09 DIAGNOSIS — Z12.11 COLON CANCER SCREENING: ICD-10-CM

## 2023-11-23 ENCOUNTER — LAB (OUTPATIENT)
Dept: FAMILY MEDICINE | Facility: CLINIC | Age: 58
End: 2023-11-23

## 2023-11-23 DIAGNOSIS — Z12.11 COLON CANCER SCREENING: ICD-10-CM

## 2023-12-22 LAB — NONINV COLON CA DNA+OCC BLD SCRN STL QL: NEGATIVE

## 2024-06-01 ENCOUNTER — HEALTH MAINTENANCE LETTER (OUTPATIENT)
Age: 59
End: 2024-06-01